# Patient Record
Sex: MALE | Race: WHITE | NOT HISPANIC OR LATINO | Employment: OTHER | URBAN - METROPOLITAN AREA
[De-identification: names, ages, dates, MRNs, and addresses within clinical notes are randomized per-mention and may not be internally consistent; named-entity substitution may affect disease eponyms.]

---

## 2017-01-03 DIAGNOSIS — E03.9 HYPOTHYROIDISM, UNSPECIFIED TYPE: ICD-10-CM

## 2017-01-03 RX ORDER — LEVOTHYROXINE SODIUM 0.1 MG/1
TABLET ORAL
Qty: 30 TAB | Refills: 0 | OUTPATIENT
Start: 2017-01-03

## 2017-01-03 RX ORDER — LEVOTHYROXINE SODIUM 88 UG/1
88 TABLET ORAL
Qty: 60 TAB | Refills: 0 | Status: SHIPPED | OUTPATIENT
Start: 2017-01-03 | End: 2017-03-01

## 2017-01-03 NOTE — TELEPHONE ENCOUNTER
We need to lower the dose of synthroid to 88mcg po q daily  Then repeat labs in 4-6 wks.  New medication filled.    Harry Morel MD  [facility]  [address] [place] [id number]

## 2017-01-04 NOTE — TELEPHONE ENCOUNTER
Phone Number Called: [phone] (home)     Message: Notified patient of new dose of levothyroxine and the lab requests. Patient stated understanding.    Left Message for patient to call back: N\A

## 2017-02-21 DIAGNOSIS — E03.1 CONGENITAL HYPOTHYROIDISM WITHOUT GOITER: ICD-10-CM

## 2017-02-21 DIAGNOSIS — E78.00 HYPERCHOLESTEREMIA: ICD-10-CM

## 2017-02-21 DIAGNOSIS — I10 ESSENTIAL HYPERTENSION: ICD-10-CM

## 2017-02-21 RX ORDER — SIMVASTATIN 20 MG
20 TABLET ORAL EVERY EVENING
Qty: 90 TAB | Refills: 0 | Status: SHIPPED | OUTPATIENT
Start: 2017-02-21 | End: 2017-03-01

## 2017-02-21 RX ORDER — HYDROCHLOROTHIAZIDE 25 MG/1
25 TABLET ORAL DAILY
Qty: 90 TAB | Refills: 0 | Status: SHIPPED | OUTPATIENT
Start: 2017-02-21 | End: 2017-06-05 | Stop reason: SDUPTHER

## 2017-02-21 RX ORDER — LEVOTHYROXINE SODIUM 0.1 MG/1
100 TABLET ORAL
Qty: 90 TAB | Refills: 0 | Status: SHIPPED | OUTPATIENT
Start: 2017-02-21 | End: 2017-06-05 | Stop reason: SDUPTHER

## 2017-02-23 ENCOUNTER — HOSPITAL ENCOUNTER (OUTPATIENT)
Dept: LAB | Facility: MEDICAL CENTER | Age: 67
End: 2017-02-23
Attending: FAMILY MEDICINE
Payer: MEDICARE

## 2017-02-23 DIAGNOSIS — E78.00 HYPERCHOLESTEREMIA: ICD-10-CM

## 2017-02-23 DIAGNOSIS — E03.1 CONGENITAL HYPOTHYROIDISM WITHOUT GOITER: ICD-10-CM

## 2017-02-23 DIAGNOSIS — I10 ESSENTIAL HYPERTENSION: ICD-10-CM

## 2017-02-23 LAB
ALBUMIN SERPL BCP-MCNC: 4.2 G/DL (ref 3.2–4.9)
ALBUMIN/GLOB SERPL: 1.4 G/DL
ALP SERPL-CCNC: 54 U/L (ref 30–99)
ALT SERPL-CCNC: 24 U/L (ref 2–50)
ANION GAP SERPL CALC-SCNC: 11 MMOL/L (ref 0–11.9)
AST SERPL-CCNC: 21 U/L (ref 12–45)
BASOPHILS # BLD AUTO: 0.06 K/UL (ref 0–0.12)
BASOPHILS NFR BLD AUTO: 1.1 % (ref 0–1.8)
BILIRUB SERPL-MCNC: 0.3 MG/DL (ref 0.1–1.5)
BUN SERPL-MCNC: 24 MG/DL (ref 8–22)
CALCIUM SERPL-MCNC: 9.7 MG/DL (ref 8.5–10.5)
CHLORIDE SERPL-SCNC: 101 MMOL/L (ref 96–112)
CHOLEST SERPL-MCNC: 240 MG/DL (ref 100–199)
CO2 SERPL-SCNC: 28 MMOL/L (ref 20–33)
CREAT SERPL-MCNC: 1.04 MG/DL (ref 0.5–1.4)
EOSINOPHIL # BLD: 0.38 K/UL (ref 0–0.51)
EOSINOPHIL NFR BLD AUTO: 6.8 % (ref 0–6.9)
ERYTHROCYTE [DISTWIDTH] IN BLOOD BY AUTOMATED COUNT: 43.6 FL (ref 35.9–50)
GLOBULIN SER CALC-MCNC: 3 G/DL (ref 1.9–3.5)
GLUCOSE SERPL-MCNC: 99 MG/DL (ref 65–99)
HCT VFR BLD AUTO: 47.9 % (ref 42–52)
HDLC SERPL-MCNC: 61 MG/DL
HGB BLD-MCNC: 16 G/DL (ref 14–18)
IMM GRANULOCYTES # BLD AUTO: 0.01 K/UL (ref 0–0.11)
IMM GRANULOCYTES NFR BLD AUTO: 0.2 % (ref 0–0.9)
LDLC SERPL CALC-MCNC: 151 MG/DL
LYMPHOCYTES # BLD: 1.39 K/UL (ref 1–4.8)
LYMPHOCYTES NFR BLD AUTO: 24.9 % (ref 22–41)
MCH RBC QN AUTO: 31.6 PG (ref 27–33)
MCHC RBC AUTO-ENTMCNC: 33.4 G/DL (ref 33.7–35.3)
MCV RBC AUTO: 94.5 FL (ref 81.4–97.8)
MONOCYTES # BLD: 0.64 K/UL (ref 0–0.85)
MONOCYTES NFR BLD AUTO: 11.5 % (ref 0–13.4)
NEUTROPHILS # BLD: 3.1 K/UL (ref 1.82–7.42)
NEUTROPHILS NFR BLD AUTO: 55.5 % (ref 44–72)
NRBC # BLD AUTO: 0 K/UL
NRBC BLD-RTO: 0 /100 WBC
PLATELET # BLD AUTO: 261 K/UL (ref 164–446)
PMV BLD AUTO: 9.7 FL (ref 9–12.9)
POTASSIUM SERPL-SCNC: 4.4 MMOL/L (ref 3.6–5.5)
PROT SERPL-MCNC: 7.2 G/DL (ref 6–8.2)
RBC # BLD AUTO: 5.07 M/UL (ref 4.7–6.1)
SODIUM SERPL-SCNC: 140 MMOL/L (ref 135–145)
T4 FREE SERPL-MCNC: 0.7 NG/DL (ref 0.53–1.43)
TRIGL SERPL-MCNC: 139 MG/DL (ref 0–149)
TSH SERPL DL<=0.005 MIU/L-ACNC: 7.94 UIU/ML (ref 0.3–3.7)
WBC # BLD AUTO: 5.6 K/UL (ref 4.8–10.8)

## 2017-02-23 PROCEDURE — 85025 COMPLETE CBC W/AUTO DIFF WBC: CPT

## 2017-02-23 PROCEDURE — 80061 LIPID PANEL: CPT

## 2017-02-23 PROCEDURE — 80053 COMPREHEN METABOLIC PANEL: CPT

## 2017-02-23 PROCEDURE — 84439 ASSAY OF FREE THYROXINE: CPT

## 2017-02-23 PROCEDURE — 36415 COLL VENOUS BLD VENIPUNCTURE: CPT

## 2017-02-23 PROCEDURE — 84443 ASSAY THYROID STIM HORMONE: CPT

## 2017-02-24 ENCOUNTER — TELEPHONE (OUTPATIENT)
Dept: MEDICAL GROUP | Age: 67
End: 2017-02-24

## 2017-02-24 NOTE — TELEPHONE ENCOUNTER
----- Message from Betina Morel M.D. sent at 2/23/2017  9:19 PM PST -----  To clarify, 1 and 1/2 tab = 150 mcg po q24h.       Harry Morel MD  [facility]  [address] [place] [id number]

## 2017-02-24 NOTE — TELEPHONE ENCOUNTER
FREDO ONLY    Phone Number Called: [phone] (home)       Message: SPOKE WITH PATIENT AND INFORMED HIM OF MESSAGE BELOW. PATIENT STATED UNDERSTANDING.    Left Message for patient to call back: N\A

## 2017-03-01 ENCOUNTER — OFFICE VISIT (OUTPATIENT)
Dept: MEDICAL GROUP | Age: 67
End: 2017-03-01
Payer: MEDICARE

## 2017-03-01 VITALS
WEIGHT: 166 LBS | BODY MASS INDEX: 26.68 KG/M2 | TEMPERATURE: 97.9 F | DIASTOLIC BLOOD PRESSURE: 86 MMHG | SYSTOLIC BLOOD PRESSURE: 128 MMHG | HEART RATE: 98 BPM | HEIGHT: 66 IN | OXYGEN SATURATION: 97 %

## 2017-03-01 DIAGNOSIS — E78.00 HYPERCHOLESTEREMIA: ICD-10-CM

## 2017-03-01 DIAGNOSIS — E03.1 CONGENITAL HYPOTHYROIDISM WITHOUT GOITER: ICD-10-CM

## 2017-03-01 DIAGNOSIS — Q67.0 CONGENITAL FACIAL ASYMMETRY: ICD-10-CM

## 2017-03-01 DIAGNOSIS — Z23 NEED FOR VACCINATION: ICD-10-CM

## 2017-03-01 DIAGNOSIS — I10 ESSENTIAL HYPERTENSION: ICD-10-CM

## 2017-03-01 DIAGNOSIS — Z23 NEED FOR PROPHYLACTIC VACCINATION WITH COMBINED VACCINE: ICD-10-CM

## 2017-03-01 PROCEDURE — 1101F PT FALLS ASSESS-DOCD LE1/YR: CPT | Performed by: FAMILY MEDICINE

## 2017-03-01 PROCEDURE — 3017F COLORECTAL CA SCREEN DOC REV: CPT | Performed by: FAMILY MEDICINE

## 2017-03-01 PROCEDURE — 4040F PNEUMOC VAC/ADMIN/RCVD: CPT | Performed by: FAMILY MEDICINE

## 2017-03-01 PROCEDURE — G8484 FLU IMMUNIZE NO ADMIN: HCPCS | Performed by: FAMILY MEDICINE

## 2017-03-01 PROCEDURE — G8432 DEP SCR NOT DOC, RNG: HCPCS | Performed by: FAMILY MEDICINE

## 2017-03-01 PROCEDURE — G8420 CALC BMI NORM PARAMETERS: HCPCS | Performed by: FAMILY MEDICINE

## 2017-03-01 PROCEDURE — 99214 OFFICE O/P EST MOD 30 MIN: CPT | Performed by: FAMILY MEDICINE

## 2017-03-01 PROCEDURE — 1036F TOBACCO NON-USER: CPT | Performed by: FAMILY MEDICINE

## 2017-03-01 RX ORDER — SIMVASTATIN 40 MG
40 TABLET ORAL EVERY EVENING
Qty: 90 TAB | Refills: 0 | Status: SHIPPED | OUTPATIENT
Start: 2017-03-01 | End: 2017-07-26 | Stop reason: SDUPTHER

## 2017-03-01 ASSESSMENT — PAIN SCALES - GENERAL: PAINLEVEL: NO PAIN

## 2017-03-01 NOTE — PROGRESS NOTES
This medical record contains text that has been entered with the assistance of computer voice recognition and dictation software.  Therefore, it may contain unintended errors in text, spelling, punctuation, or grammar    Chief Complaint   Patient presents with   • Follow-Up     lab review,HTN, medication management       Dannie Fofana is a 66 y.o. male here evaluation and management of: Routine follow-up, hypertension, labrum, hypothyroidism      HPI:     Hypercholesteremia  He has been compliant with his simvastatin 20 mg by mouth daily at bedtime. The patient has been on a statin for years and tolerating this fine. The patient denies any muscle aches, no abdominal pain and no history of elevated liver enzymes.    Results for DANNIE FOFANA (MRN [id number]) as of 3/1/2017 13:37   Ref. Range 2/23/2017 08:06   Cholesterol,Tot Latest Ref Range: 100-199 mg/dL 240 (H)   Triglycerides Latest Ref Range: 0-149 mg/dL 139   HDL Latest Ref Range: >=40 mg/dL 61   LDL Latest Ref Range: <100 mg/dL 151 (H)       Essential hypertension  After increasing his HCTZ to 25mg his bp is normal at home. However he states that he has had some elevated bp's at home (120-150/82-95) but thinks he was measuring at wrong.  We've reviewed how to measure her blood pressure. He denies any headaches, no tunnel vision, no fatigue, no syncopal episodes.    Congenital hypothyroidism without goiter  We recently increased his Synthroid to 100 µg by mouth daily as he was not at goal. He is due for repeat labs in 4 weeks.    Results for DANNIE FOFANA (MRN [id number]) as of 3/1/2017 14:01   Ref. Range 2/23/2017 08:06   TSH Latest Ref Range: 0.300-3.700 uIU/mL 7.940 (H)   Free T-4 Latest Ref Range: 0.53-1.43 ng/dL 0.70       Congenital facial asymmetry  Right side of his face last mouth always has dropped. He states he has no history of stroke, no history of Bell's palsy that he was born this way.    Need for prophylactic vaccination with  combined vaccine  Patient has reviewed shingles vaccine as well as flu vaccine.    Current medicines (including changes today)  Current Outpatient Prescriptions   Medication Sig Dispense Refill   • simvastatin (ZOCOR) 40 MG Tab Take 1 Tab by mouth every evening. 90 Tab 0   • Misc. Devices Misc Please provide patient with shingles vaccine 1 Application 0   • levothyroxine (SYNTHROID) 100 MCG Tab Take 1 Tab by mouth Every morning on an empty stomach. 90 Tab 0   • aspirin (ASA) 81 MG Chew Tab chewable tablet Take 1 Tab by mouth every day. 100 Tab 2   • sildenafil citrate (VIAGRA) 100 MG tablet Take 0.5 Tabs by mouth as needed for Erectile Dysfunction. 30 Tab 3   • Misc. Devices Misc Please provide patient with shingles vaccine 1 Application 0   • timolol (BETIMOL) 0.5 % ophthalmic solution Place 1 Drop in both eyes 2 times a day. use in affected eye(s)   Indications: Increased Pressure Within the Eye     • hydrochlorothiazide (HYDRODIURIL) 25 MG Tab Take 1 Tab by mouth every day. 90 Tab 0   • hydrochlorothiazide (HYDRODIURIL) 12.5 MG tablet Take 1 Tab by mouth every day. (Patient not taking: Reported on 3/1/2017) 60 Tab 0   • levothyroxine (SYNTHROID) 125 MCG Tab Take 1 Tab by mouth every day. (Patient not taking: Reported on 8/12/2016) 60 Tab 0   • levothyroxine (SYNTHROID) 125 MCG TABS TAKE ONE TABLET BY MOUTH DAILY (Patient not taking: Reported on 3/1/2017) 90 Tab 3   • Brimonidine Tartrate-Timolol (COMBIGAN) 0.2-0.5 % SOLN by Ophthalmic route.       No current facility-administered medications for this visit.     He  has a past medical history of Thyroid disease.  He  has past surgical history that includes hernia repair.  Social History   Substance Use Topics   • Smoking status: Never Smoker    • Smokeless tobacco: Never Used   • Alcohol Use: 0.0 oz/week     0 Standard drinks or equivalent per week      Comment: 1 glass a wine 5 days a week.     Social History     Social History Narrative     Family History  "  Problem Relation Age of Onset   • Heart Attack Mother    • Cancer Father      brain tumor   • Stroke Maternal Grandmother 75   • Heart Disease Paternal Grandmother    • Heart Attack Paternal Grandfather      Family Status   Relation Status Death Age   • Mother     • Father     • Brother Alive    • Maternal Grandmother     • Maternal Grandfather     • Paternal Grandmother     • Paternal Grandfather           ROS  Please see history of present illness    All other systems reviewed and are negative     Objective:     Blood pressure 128/86, pulse 98, temperature 36.6 °C (97.9 °F), height 1.676 m (5' 6\"), weight 75.297 kg (166 lb), SpO2 97 %. Body mass index is 26.81 kg/(m^2).  Physical Exam:    Constitutional: Alert, no distress.  Skin: Warm, dry, good turgor, no rashes in visible areas.  FACE--right side facial droop noted, unchanged (congenital)  Eye: Equal, round and reactive, conjunctiva clear, lids normal.  ENMT: Lips without lesions, good dentition, oropharynx clear.  Neck: Trachea midline, no masses, no thyromegaly. No cervical or supraclavicular lymphadenopathy.  Respiratory: Unlabored respiratory effort, lungs clear to auscultation, no wheezes, no ronchi.  Cardiovascular: Normal S1, S2, no murmur, no edema.  Abdomen: Soft, non-tender, no masses, no hepatosplenomegaly.  Psych: Alert and oriented x3, normal affect and mood.          Assessment and Plan:   The following treatment plan was discussed, again this medical record contains text that has been entered with the assistance of computer voice recognition and dictation software.  Therefore, it may contain unintended errors in text, spelling, punctuation, or grammar      1. Hypercholesteremia  We will increase Zocor to 40 mg by mouth daily at bedtime  Repeat labs in 2-3 months  He has been tolerating statin  - simvastatin (ZOCOR) 40 MG Tab; Take 1 Tab by mouth every evening.  Dispense: 90 Tab; Refill: 0      2. " Congenital hypothyroidism without goiter  We increased Synthroid to 100 mg by mouth daily  Repeat labs in 4-6 weeks    3. Need for vaccination  He refuses shingles vaccine as well as flu vaccine  - Misc. Devices Misc; Please provide patient with shingles vaccine  Dispense: 1 Application; Refill: 0    4. Essential hypertension  We will stay on this dose of her thiazide  He was instructed on how to appropriately manage his blood pressure  Send  Me  week home blood pressure log  Then we will adjust accordingly    6. Congenital facial asymmetry  Not  from CVA or bell's palsy  This has been present from birth          Followup: No Follow-up on file.

## 2017-03-01 NOTE — ASSESSMENT & PLAN NOTE
Right side of his face last mouth always has dropped. He states he has no history of stroke, no history of Bell's palsy that he was born this way.

## 2017-03-01 NOTE — ASSESSMENT & PLAN NOTE
We recently increased his Synthroid to 100 µg by mouth daily as he was not at goal. He is due for repeat labs in 4 weeks.    Results for DANNIE FOFANA (MRN [id number]) as of 3/1/2017 14:01   Ref. Range 2/23/2017 08:06   TSH Latest Ref Range: 0.300-3.700 uIU/mL 7.940 (H)   Free T-4 Latest Ref Range: 0.53-1.43 ng/dL 0.70

## 2017-03-01 NOTE — ASSESSMENT & PLAN NOTE
He has been compliant with his simvastatin 20 mg by mouth daily at bedtime. The patient has been on a statin for years and tolerating this fine. The patient denies any muscle aches, no abdominal pain and no history of elevated liver enzymes.    Results for BRIGIDO QUINTENJANNETHCLARI (MRN [id number]) as of 3/1/2017 13:37   Ref. Range 2/23/2017 08:06   Cholesterol,Tot Latest Ref Range: 100-199 mg/dL 240 (H)   Triglycerides Latest Ref Range: 0-149 mg/dL 139   HDL Latest Ref Range: >=40 mg/dL 61   LDL Latest Ref Range: <100 mg/dL 151 (H)

## 2017-03-01 NOTE — MR AVS SNAPSHOT
"        Yovany Estes   3/1/2017 1:20 PM   Office Visit   MRN: [id number]    Department:  [address] [place] [address]   Dept Phone:  [phone]    Description:  Male : 1950   Provider:  Betina Morel M.D.           Reason for Visit     Follow-Up lab review,HTN, medication management      Allergies as of 3/1/2017     No Known Allergies      You were diagnosed with     Hypercholesteremia   [868015]       Elevated BP   [820567]       Congenital hypothyroidism without goiter   [721361]       Need for vaccination   [275242]       Essential hypertension   [5132423]       Congenital facial asymmetry   [472878]       Need for prophylactic vaccination with combined vaccine   [122284]         Vital Signs     Blood Pressure Pulse Temperature Height Weight Body Mass Index    128/86 mmHg 98 36.6 °C (97.9 °F) 1.676 m (5' 6\") 75.297 kg (166 lb) 26.81 kg/m2    Oxygen Saturation Smoking Status                97% Never Smoker           Basic Information     Date Of Birth Sex Race Ethnicity Preferred Language    1950 Male White Non- English      Problem List              ICD-10-CM Priority Class Noted - Resolved    Hypercholesteremia E78.00   2015 - Present    Glaucoma H40.9   2015 - Present    Need for prophylactic vaccination with combined vaccine Z23   2016 - Present    Elevated BP I10   2016 - Present    Preventative health care Z00.00   2016 - Present    ED (erectile dysfunction) N52.9   11/15/2016 - Present    Need for vaccination Z23   11/15/2016 - Present    Essential hypertension I10   11/15/2016 - Present    Congenital hypothyroidism without goiter E03.1   3/1/2017 - Present    Congenital facial asymmetry Q67.0   3/1/2017 - Present      Health Maintenance        Date Due Completion Dates    IMM ZOSTER VACCINE 2008 ---    IMM INFLUENZA (1) 2016 ---    COLONOSCOPY 3/23/2023 3/23/2013 (Prv Comp)    Override on 3/23/2013: Previously completed (was told clear for 10 " more years.)    IMM DTaP/Tdap/Td Vaccine (2 - Td) 4/29/2025 4/29/2015            Current Immunizations     13-VALENT PCV PREVNAR 4/29/2015    Pneumococcal polysaccharide vaccine (PPSV-23) 8/18/2016  5:37 PM    Tdap Vaccine 4/29/2015      Below and/or attached are the medications your provider expects you to take. Review all of your home medications and newly ordered medications with your provider and/or pharmacist. Follow medication instructions as directed by your provider and/or pharmacist. Please keep your medication list with you and share with your provider. Update the information when medications are discontinued, doses are changed, or new medications (including over-the-counter products) are added; and carry medication information at all times in the event of emergency situations     Allergies:  No Known Allergies          Medications  Valid as of: March 01, 2017 -  2:09 PM    Generic Name Brand Name Tablet Size Instructions for use    Aspirin (Chew Tab) ASA 81 MG Take 1 Tab by mouth every day.        Brimonidine Tartrate-Timolol (Solution) Brimonidine Tartrate-Timolol 0.2-0.5 % by Ophthalmic route.        HydroCHLOROthiazide (Tab) HYDRODIURIL 12.5 MG Take 1 Tab by mouth every day.        HydroCHLOROthiazide (Tab) HYDRODIURIL 25 MG Take 1 Tab by mouth every day.        Levothyroxine Sodium (Tab) SYNTHROID 125 MCG TAKE ONE TABLET BY MOUTH DAILY        Levothyroxine Sodium (Tab) SYNTHROID 125 MCG Take 1 Tab by mouth every day.        Levothyroxine Sodium (Tab) SYNTHROID 100 MCG Take 1 Tab by mouth Every morning on an empty stomach.        Misc. Devices (Misc) Misc. Devices  Please provide patient with shingles vaccine        Misc. Devices (Misc) Misc. Devices  Please provide patient with shingles vaccine        Sildenafil Citrate (Tab) VIAGRA 100 MG Take 0.5 Tabs by mouth as needed for Erectile Dysfunction.        Simvastatin (Tab) ZOCOR 40 MG Take 1 Tab by mouth every evening.        Timolol Hemihydrate  (Solution) BETIMOL 0.5 % Place 1 Drop in both eyes 2 times a day. use in affected eye(s)   Indications: Increased Pressure Within the Eye        .                 Medicines prescribed today were sent to:     [id number] - [place], [place] - [address] [place] [address] AT [address] [address]    Phone: [phone] Fax: [phone]    Open 24 Hours?: No      Medication refill instructions:       If your prescription bottle indicates you have medication refills left, it is not necessary to call your provider’s office. Please contact your pharmacy and they will refill your medication.    If your prescription bottle indicates you do not have any refills left, you may request refills at any time through one of the following ways: The online [employer] system (except Urgent Care), by calling your provider’s office, or by asking your pharmacy to contact your provider’s office with a refill request. Medication refills are processed only during regular business hours and may not be available until the next business day. Your provider may request additional information or to have a follow-up visit with you prior to refilling your medication.   *Please Note: Medication refills are assigned a new Rx number when refilled electronically. Your pharmacy may indicate that no refills were authorized even though a new prescription for the same medication is available at the pharmacy. Please request the medicine by name with the pharmacy before contacting your provider for a refill.           [employer] Access Code: Activation code not generated  Current [employer] Status: Active

## 2017-03-01 NOTE — ASSESSMENT & PLAN NOTE
After increasing his HCTZ to 25mg his bp is normal at home. However he states that he has had some elevated bp's at home (120-150/82-95) but thinks he was measuring at wrong.  We've reviewed how to measure her blood pressure. He denies any headaches, no tunnel vision, no fatigue, no syncopal episodes.

## 2017-06-05 ENCOUNTER — PATIENT MESSAGE (OUTPATIENT)
Dept: MEDICAL GROUP | Age: 67
End: 2017-06-05

## 2017-06-05 DIAGNOSIS — I10 ESSENTIAL HYPERTENSION: ICD-10-CM

## 2017-06-05 RX ORDER — LEVOTHYROXINE SODIUM 0.1 MG/1
100 TABLET ORAL
Qty: 90 TAB | Refills: 0 | Status: SHIPPED | OUTPATIENT
Start: 2017-06-05 | End: 2017-09-06 | Stop reason: SDUPTHER

## 2017-06-05 RX ORDER — HYDROCHLOROTHIAZIDE 25 MG/1
25 TABLET ORAL DAILY
Qty: 90 TAB | Refills: 0 | Status: SHIPPED | OUTPATIENT
Start: 2017-06-05 | End: 2017-09-14 | Stop reason: SDUPTHER

## 2017-07-07 ENCOUNTER — OFFICE VISIT (OUTPATIENT)
Dept: URGENT CARE | Facility: PHYSICIAN GROUP | Age: 67
End: 2017-07-07
Payer: MEDICARE

## 2017-07-07 ENCOUNTER — HOSPITAL ENCOUNTER (OUTPATIENT)
Facility: MEDICAL CENTER | Age: 67
End: 2017-07-07
Attending: PHYSICIAN ASSISTANT
Payer: MEDICARE

## 2017-07-07 VITALS
RESPIRATION RATE: 14 BRPM | HEIGHT: 67 IN | SYSTOLIC BLOOD PRESSURE: 132 MMHG | TEMPERATURE: 98.4 F | HEART RATE: 87 BPM | BODY MASS INDEX: 24.64 KG/M2 | WEIGHT: 157 LBS | OXYGEN SATURATION: 95 % | DIASTOLIC BLOOD PRESSURE: 90 MMHG

## 2017-07-07 DIAGNOSIS — M10.9 ACUTE GOUT INVOLVING TOE OF RIGHT FOOT, UNSPECIFIED CAUSE: ICD-10-CM

## 2017-07-07 LAB — URATE SERPL-MCNC: 3.8 MG/DL (ref 2.5–8.3)

## 2017-07-07 PROCEDURE — 99214 OFFICE O/P EST MOD 30 MIN: CPT | Performed by: PHYSICIAN ASSISTANT

## 2017-07-07 PROCEDURE — 84550 ASSAY OF BLOOD/URIC ACID: CPT

## 2017-07-07 PROCEDURE — 36415 COLL VENOUS BLD VENIPUNCTURE: CPT | Performed by: PHYSICIAN ASSISTANT

## 2017-07-07 RX ORDER — INDOMETHACIN 50 MG/1
50 CAPSULE ORAL 3 TIMES DAILY
Qty: 90 CAP | Refills: 0 | Status: SHIPPED | OUTPATIENT
Start: 2017-07-07 | End: 2018-06-19 | Stop reason: SDUPTHER

## 2017-07-07 ASSESSMENT — ENCOUNTER SYMPTOMS
CHILLS: 0
SHORTNESS OF BREATH: 0
HEADACHES: 0
ABDOMINAL PAIN: 0
DIZZINESS: 0
SPUTUM PRODUCTION: 0
FEVER: 0
DIARRHEA: 0
VOMITING: 0
NAUSEA: 0

## 2017-07-07 NOTE — PROGRESS NOTES
"Subjective:      Yovany Estes is a 66 y.o. male who presents with Foot Pain            HPI  Patient presents to urgent care reporting a gout flare up x 3 days in his right great toe. States he had a mild amount of pain 4 days ago that resolved quickly, then early this morning around 1-2 am he awoke with severe pain to his right great toe. Reports one episode of a gout attack almost 2 years ago. He was given allopurinol at that time but only took it for about 1 month, then stopped. He took one dose of the allopurinol this morning without any relief. States he does eat red meat and seafood, but infrequently. He is currently taking HCTZ for HTN. No known kidney disease.     Review of Systems   Constitutional: Negative for fever and chills.   Respiratory: Negative for sputum production and shortness of breath.    Cardiovascular: Negative for chest pain.   Gastrointestinal: Negative for nausea, vomiting, abdominal pain and diarrhea.   Genitourinary: Negative.    Musculoskeletal:        Positive for toe pain.    Neurological: Negative for dizziness and headaches.          Objective:     /90 mmHg  Pulse 87  Temp(Src) 36.9 °C (98.4 °F)  Resp 14  Ht 1.702 m (5' 7\")  Wt 71.215 kg (157 lb)  BMI 24.58 kg/m2  SpO2 95%     Physical Exam   Constitutional: He is oriented to person, place, and time. He appears well-developed and well-nourished. No distress.   HENT:   Head: Normocephalic and atraumatic.   Eyes: Pupils are equal, round, and reactive to light.   Neck: Normal range of motion.   Cardiovascular: Normal rate.    Pulmonary/Chest: Effort normal.   Musculoskeletal: Normal range of motion.        Feet:    Erythema, edema, and TTP over right medial MTP joint. Hot to touch. Decreased ROM secondary to pain.    Neurological: He is alert and oriented to person, place, and time.   Skin: Skin is warm and dry. He is not diaphoretic.   Psychiatric: He has a normal mood and affect. His behavior is normal.   Nursing " note and vitals reviewed.              PMH:  has a past medical history of Thyroid disease.  MEDS:   Current outpatient prescriptions:   •  indomethacin (INDOCIN) 50 MG Cap, Take 1 Cap by mouth 3 times a day., Disp: 90 Cap, Rfl: 0  •  hydrochlorothiazide (HYDRODIURIL) 25 MG Tab, Take 1 Tab by mouth every day., Disp: 90 Tab, Rfl: 0  •  levothyroxine (SYNTHROID) 100 MCG Tab, Take 1 Tab by mouth Every morning on an empty stomach., Disp: 90 Tab, Rfl: 0  •  simvastatin (ZOCOR) 40 MG Tab, Take 1 Tab by mouth every evening., Disp: 90 Tab, Rfl: 0  •  Misc. Devices Misc, Please provide patient with shingles vaccine, Disp: 1 Application, Rfl: 0  •  aspirin (ASA) 81 MG Chew Tab chewable tablet, Take 1 Tab by mouth every day., Disp: 100 Tab, Rfl: 2  •  sildenafil citrate (VIAGRA) 100 MG tablet, Take 0.5 Tabs by mouth as needed for Erectile Dysfunction., Disp: 30 Tab, Rfl: 3  •  Misc. Devices Misc, Please provide patient with shingles vaccine, Disp: 1 Application, Rfl: 0  •  hydrochlorothiazide (HYDRODIURIL) 12.5 MG tablet, Take 1 Tab by mouth every day. (Patient not taking: Reported on 3/1/2017), Disp: 60 Tab, Rfl: 0  •  levothyroxine (SYNTHROID) 125 MCG Tab, Take 1 Tab by mouth every day. (Patient not taking: Reported on 8/12/2016), Disp: 60 Tab, Rfl: 0  •  levothyroxine (SYNTHROID) 125 MCG TABS, TAKE ONE TABLET BY MOUTH DAILY (Patient not taking: Reported on 3/1/2017), Disp: 90 Tab, Rfl: 3  •  Brimonidine Tartrate-Timolol (COMBIGAN) 0.2-0.5 % SOLN, by Ophthalmic route., Disp: , Rfl:   •  timolol (BETIMOL) 0.5 % ophthalmic solution, Place 1 Drop in both eyes 2 times a day. use in affected eye(s)   Indications: Increased Pressure Within the Eye, Disp: , Rfl:   ALLERGIES: No Known Allergies  SURGHX:   Past Surgical History   Procedure Laterality Date   • Hernia repair       SOCHX:  reports that he has never smoked. He has never used smokeless tobacco. He reports that he drinks alcohol. He reports that he does not use illicit  drugs.  FH: family history includes Cancer in his father; Heart Attack in his mother and paternal grandfather; Heart Disease in his paternal grandmother; Stroke (age of onset: 75) in his maternal grandmother.    Assessment/Plan:     1. Acute gout involving toe of right foot, unspecified cause  - URIC ACID, SERUM  - indomethacin (INDOCIN) 50 MG Cap; Take 1 Cap by mouth 3 times a day.  Dispense: 90 Cap; Refill: 0   - Take with food    Discussed gout diet at today's visit. Avoid red meats, seafood, and alcohol while symptomatic. Increased fluids. Advised patient to discuss HCTZ use with PCP at his next appointment, along with recommendations for allopurinol therapy. The patient demonstrated a good understanding and agreed with the treatment plan.

## 2017-07-07 NOTE — MR AVS SNAPSHOT
"        Yovany Estes   2017 10:30 AM   Office Visit   MRN: [id number]    Department:  [facility]   Dept Phone:  [phone]    Description:  Male : 1950   Provider:  Raya Randall PA-C           Reason for Visit     Foot Pain right foot, gout flare up      Allergies as of 2017     No Known Allergies      You were diagnosed with     Acute gout involving toe of right foot, unspecified cause   [[id number]]         Vital Signs     Blood Pressure Pulse Temperature Respirations Height Weight    132/90 mmHg 87 36.9 °C (98.4 °F) 14 1.702 m (5' 7\") 71.215 kg (157 lb)    Body Mass Index Oxygen Saturation Smoking Status             24.58 kg/m2 95% Never Smoker          Basic Information     Date Of Birth Sex Race Ethnicity Preferred Language    1950 Male White Non- English      Problem List              ICD-10-CM Priority Class Noted - Resolved    Hypercholesteremia E78.00   2015 - Present    Glaucoma H40.9   2015 - Present    Need for prophylactic vaccination with combined vaccine Z23   2016 - Present    Elevated BP [id number]   2016 - Present    Preventative health care Z00.00   2016 - Present    ED (erectile dysfunction) N52.9   11/15/2016 - Present    Need for vaccination Z23   11/15/2016 - Present    Essential hypertension I10   11/15/2016 - Present    Congenital hypothyroidism without goiter E03.1   3/1/2017 - Present    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 Review all of your home medications and newly ordered medications with your provider and/or pharmacist. Follow medication instructions as directed by your provider and/or pharmacist. Please keep your medication list with you and share with your provider. Update the information when medications are discontinued, doses are changed, or new medications (including over-the-counter products) are added; and carry medication information at all times in the event of emergency situations     Allergies:  No Known Allergies          Medications  Valid as of: July 07, 2017 - 11:21 AM    Generic Name Brand Name Tablet Size Instructions for use    Aspirin (Chew Tab) ASA 81 MG Take 1 Tab by mouth every day.        Brimonidine Tartrate-Timolol (Solution) Brimonidine Tartrate-Timolol 0.2-0.5 % by Ophthalmic route.        HydroCHLOROthiazide (Tab) HYDRODIURIL 12.5 MG Take 1 Tab by mouth every day.        HydroCHLOROthiazide (Tab) HYDRODIURIL 25 MG Take 1 Tab by mouth every day.        Indomethacin (Cap) INDOCIN 50 MG Take 1 Cap by mouth 3 times a day.        Levothyroxine Sodium (Tab) SYNTHROID 125 MCG TAKE ONE TABLET BY MOUTH DAILY        Levothyroxine Sodium (Tab) SYNTHROID 125 MCG Take 1 Tab by mouth every day.        Levothyroxine Sodium (Tab) SYNTHROID 100 MCG Take 1 Tab by mouth Every morning on an empty stomach.        Misc. Devices (Misc) Misc. Devices  Please provide patient with shingles vaccine        Misc. Devices (Misc) Misc. Devices  Please provide patient with shingles vaccine        Sildenafil Citrate (Tab) VIAGRA 100 MG Take 0.5 Tabs by mouth as needed for Erectile Dysfunction.        Simvastatin (Tab) ZOCOR 40 MG Take 1 Tab by mouth every evening.        Timolol Hemihydrate (Solution) BETIMOL 0.5 % Place 1 Drop in both eyes 2 times a day. use in affected eye(s)   Indications: Increased Pressure Within the Eye        .                 Medicines  prescribed today were sent to:     [facility][id number] - [place], [place] - [address]    [address] [place] [address]    Phone: [phone] Fax: [phone]    Open 24 Hours?: No      Medication refill instructions:       If your prescription bottle indicates you have medication refills left, it is not necessary to call your provider’s office. Please contact your pharmacy and they will refill your medication.    If your prescription bottle indicates you do not have any refills left, you may request refills at any time through one of the following ways: The online [employer] system (except Urgent Care), by calling your provider’s office, or by asking your pharmacy to contact your provider’s office with a refill request. Medication refills are processed only during regular business hours and may not be available until the next business day. Your provider may request additional information or to have a follow-up visit with you prior to refilling your medication.   *Please Note: Medication refills are assigned a new Rx number when refilled electronically. Your pharmacy may indicate that no refills were authorized even though a new prescription for the same medication is available at the pharmacy. Please request the medicine by name with the pharmacy before contacting your provider for a refill.           [employer] Access Code: Activation code not generated  Current [employer] Status: Active

## 2017-07-08 ENCOUNTER — TELEPHONE (OUTPATIENT)
Dept: URGENT CARE | Facility: CLINIC | Age: 67
End: 2017-07-08

## 2017-07-08 NOTE — TELEPHONE ENCOUNTER
Spoke to patient and informed him of normal uric acid level. Patient reports his symptoms are significantly improved since starting Indomethacin yesterday. Encouraged to continue taking medication until symptoms completely resolve.

## 2017-07-26 ENCOUNTER — PATIENT MESSAGE (OUTPATIENT)
Dept: MEDICAL GROUP | Age: 67
End: 2017-07-26

## 2017-07-26 DIAGNOSIS — E78.00 HYPERCHOLESTEREMIA: ICD-10-CM

## 2017-07-26 RX ORDER — SIMVASTATIN 40 MG
40 TABLET ORAL EVERY EVENING
Qty: 90 TAB | Refills: 0 | Status: SHIPPED | OUTPATIENT
Start: 2017-07-26 | End: 2017-11-02 | Stop reason: SDUPTHER

## 2017-07-26 NOTE — TELEPHONE ENCOUNTER
Was the patient seen in the last year in this department? Yes     Does patient have an active prescription for medications requested? No     Received Request Via: Patient     Requested Prescriptions     Pending Prescriptions Disp Refills   • simvastatin (ZOCOR) 40 MG Tab 90 Tab 0     Sig: Take 1 Tab by mouth every evening.

## 2017-09-14 DIAGNOSIS — I10 ESSENTIAL HYPERTENSION: ICD-10-CM

## 2017-09-14 DIAGNOSIS — Z00.00 PREVENTATIVE HEALTH CARE: ICD-10-CM

## 2017-09-14 RX ORDER — HYDROCHLOROTHIAZIDE 25 MG/1
TABLET ORAL
Qty: 90 TAB | Refills: 0 | Status: SHIPPED | OUTPATIENT
Start: 2017-09-14 | End: 2017-12-19 | Stop reason: SDUPTHER

## 2017-09-14 RX ORDER — ASPIRIN 81 MG/1
TABLET, CHEWABLE ORAL
Qty: 90 TAB | Refills: 0 | Status: SHIPPED | OUTPATIENT
Start: 2017-09-14 | End: 2017-12-19 | Stop reason: SDUPTHER

## 2017-11-02 DIAGNOSIS — E78.00 HYPERCHOLESTEREMIA: ICD-10-CM

## 2017-11-03 RX ORDER — SIMVASTATIN 40 MG
TABLET ORAL
Qty: 90 TAB | Refills: 1 | Status: SHIPPED | OUTPATIENT
Start: 2017-11-03 | End: 2018-06-08 | Stop reason: SDUPTHER

## 2017-11-10 ENCOUNTER — PATIENT OUTREACH (OUTPATIENT)
Dept: HEALTH INFORMATION MANAGEMENT | Facility: OTHER | Age: 67
End: 2017-11-10

## 2017-11-11 NOTE — PROGRESS NOTES
Attempt #:1    WebIZ Checked & Epic Updated: Yes  · WebIZ Recommendations: FLU and ZOSTAVAX (Shingles)  · Is patient due for Tdap? NO  · Is patient due for Shingles? YES. Patient was not notified of copay/out of pocket cost.  [facility] Verified: yes  Verify PCP: yes    Communication Preference Obtained: yes     Review Care Team: yes    Annual Wellness Visit Scheduling  1. Scheduling Status:Scheduled          Care Gap Scheduling (Attempt to Schedule EACH Overdue Care Gap!)     Health Maintenance Due   Topic Date Due   • IMM ZOSTER VACCINE  12/09/2008   • Annual Wellness Visit  04/29/2016   • IMM INFLUENZA (1) 09/01/2017        Scheduled patient for Annual Wellness Visit and Immunizations: FLU and TDAP       ViaView Activation: already active  ViaView Katerine: no  Virtual Visits: no  Opt In to Text Messages: no

## 2017-11-22 ENCOUNTER — TELEPHONE (OUTPATIENT)
Dept: MEDICAL GROUP | Age: 67
End: 2017-11-22

## 2017-11-22 NOTE — TELEPHONE ENCOUNTER
ANNUAL WELLNESS VISIT PRE-VISIT PLANNING     1.  Reviewed note from last office visit with PCP: YES    2.  If any orders were placed at last visit, do we have Results/Consult Notes?        •  Labs - Labs ordered, completed on 7/7 and results are in chart.   Note: If patient appointment is for lab review and patient did not complete labs, check with provider if OK to reschedule patient until labs completed.       •  Imaging - Imaging was not ordered at last office visit.       •  Referrals - No referrals were ordered at last office visit.    3.  Immunizations were updated in [facility] using WebIZ?: Yes       •  WebIZ Recommendations: FLU and ZOSTAVAX (Shingles)       •  Is patient due for Tdap? NO       •  Is patient due for Shingles? YES. Patient was notified of copay/out of pocket cost.     4.  Patient is due for the following Health Maintenance Topics:   Health Maintenance Due   Topic Date Due   • IMM ZOSTER VACCINE  12/09/2008   • Annual Wellness Visit  04/29/2016   • IMM INFLUENZA (1) 09/01/2017       - Patient is up-to-date on all Health Maintenance topics. No records have been requested at this time.    5.  Reviewed/Updated the following with patient:       •   Preferred Pharmacy? YES       •   Preferred Lab? YES       •   Preferred Communication? YES       •   Allergies? YES       •   Medications? YES. Was Abstract Encounter opened and chart updated? YES       •   Social History? YES. Was Abstract Encounter opened and chart updated? YES       •   Family History (document living status of immediate family members and if + hx of cancer, diabetes, hypertension, hyperlipidemia, heart attack, stroke) YES. Was Abstract Encounter opened and chart updated? YES    6.  Care Team Updated:       •   DME Company (gait device, O2, CPAP, etc.): YES       •   Other Specialists (eye doctor, derm, GYN, cardiology, endo, etc): YES    7.  Patient has the following Care Path diagnoses on Problem List:  NONE    8.  Specialty Comments  was updated with diagnosis information provided by SCP: N/A    9.  Patient was advised: “This is a free wellness visit. The provider will screen for medical conditions to help you stay healthy. If you have other concerns to address you may be asked to discuss these at a separate visit or there may be an additional fee.”     6.  Patient was informed to arrive 15 min prior to their scheduled appointment and bring in their medication bottles.

## 2017-11-30 ENCOUNTER — OFFICE VISIT (OUTPATIENT)
Dept: MEDICAL GROUP | Age: 67
End: 2017-11-30
Payer: MEDICARE

## 2017-11-30 VITALS
SYSTOLIC BLOOD PRESSURE: 138 MMHG | DIASTOLIC BLOOD PRESSURE: 82 MMHG | HEART RATE: 76 BPM | TEMPERATURE: 97.7 F | BODY MASS INDEX: 25.3 KG/M2 | HEIGHT: 67 IN | WEIGHT: 161.2 LBS | OXYGEN SATURATION: 93 %

## 2017-11-30 DIAGNOSIS — Q67.0 CONGENITAL FACIAL ASYMMETRY: ICD-10-CM

## 2017-11-30 DIAGNOSIS — H40.10X0 OPEN-ANGLE GLAUCOMA OF LEFT EYE, UNSPECIFIED GLAUCOMA STAGE, UNSPECIFIED OPEN-ANGLE GLAUCOMA TYPE: ICD-10-CM

## 2017-11-30 DIAGNOSIS — E03.1 CONGENITAL HYPOTHYROIDISM WITHOUT GOITER: ICD-10-CM

## 2017-11-30 DIAGNOSIS — I10 ESSENTIAL HYPERTENSION: ICD-10-CM

## 2017-11-30 DIAGNOSIS — Z00.00 MEDICARE ANNUAL WELLNESS VISIT, INITIAL: ICD-10-CM

## 2017-11-30 DIAGNOSIS — M77.12 LATERAL EPICONDYLITIS OF LEFT ELBOW: ICD-10-CM

## 2017-11-30 DIAGNOSIS — N52.01 ERECTILE DYSFUNCTION DUE TO ARTERIAL INSUFFICIENCY: ICD-10-CM

## 2017-11-30 DIAGNOSIS — E78.00 HYPERCHOLESTEREMIA: ICD-10-CM

## 2017-11-30 DIAGNOSIS — K59.01 SLOW TRANSIT CONSTIPATION: ICD-10-CM

## 2017-11-30 PROCEDURE — G0439 PPPS, SUBSEQ VISIT: HCPCS | Performed by: FAMILY MEDICINE

## 2017-11-30 RX ORDER — DOCUSATE SODIUM 100 MG/1
100 CAPSULE, LIQUID FILLED ORAL 2 TIMES DAILY
Qty: 90 CAP | Refills: 0 | Status: SHIPPED | OUTPATIENT
Start: 2017-11-30 | End: 2019-10-24

## 2017-11-30 ASSESSMENT — PATIENT HEALTH QUESTIONNAIRE - PHQ9: CLINICAL INTERPRETATION OF PHQ2 SCORE: 0

## 2017-11-30 NOTE — PROGRESS NOTES
Chief Complaint   Patient presents with   • Annual Wellness Visit         HPI:  Yovany is a 66 y.o. here for Medicare Annual Wellness Visit        Patient Active Problem List    Diagnosis Date Noted   • Slow transit constipation 11/30/2017   • Lateral epicondylitis of left elbow 11/30/2017   • Medicare annual wellness visit, initial 11/30/2017   • Congenital hypothyroidism without goiter 03/01/2017   • Congenital facial asymmetry 03/01/2017   • ED (erectile dysfunction) 11/15/2016   • Need for vaccination 11/15/2016   • Essential hypertension 11/15/2016   • Need for prophylactic vaccination with combined vaccine 08/18/2016   • Elevated BP 08/18/2016   • Preventative health care 08/18/2016   • Hypercholesteremia 04/29/2015   • Glaucoma 04/29/2015       Current Outpatient Prescriptions   Medication Sig Dispense Refill   • psyllium (METAMUCIL SMOOTH TEXTURE) 28 % packet Take 1 Packet by mouth 2 times a day. 100 Each 3   • docusate sodium (COLACE) 100 MG Cap Take 1 Cap by mouth 2 times a day. 90 Cap 0   • Misc. Devices Misc Please provide patient with an elbow air cast (any brand ok) 1 Application 1   • simvastatin (ZOCOR) 40 MG Tab TAKE ONE TABLET BY MOUTH EVERY EVENING 90 Tab 1   • hydrochlorothiazide (HYDRODIURIL) 25 MG Tab TAKE ONE TABLET BY MOUTH DAILY 90 Tab 0   • aspirin (ASA) 81 MG Chew Tab chewable tablet CHEW AND SWALLOW ONE TABLET BY MOUTH DAILY 90 Tab 0   • levothyroxine (SYNTHROID) 100 MCG Tab TAKE ONE TABLET BY MOUTH EVERY MORNING ON AN EMPTY STOMACH 90 Tab 0   • hydrochlorothiazide (HYDRODIURIL) 12.5 MG tablet Take 1 Tab by mouth every day. 60 Tab 0   • timolol (BETIMOL) 0.5 % ophthalmic solution Place 1 Drop in both eyes 2 times a day. use in affected eye(s)   Indications: Increased Pressure Within the Eye     • indomethacin (INDOCIN) 50 MG Cap Take 1 Cap by mouth 3 times a day. 90 Cap 0   • Misc. Devices Misc Please provide patient with shingles vaccine 1 Application 0   • sildenafil citrate  (VIAGRA) 100 MG tablet Take 0.5 Tabs by mouth as needed for Erectile Dysfunction. 30 Tab 3   • Misc. Devices Misc Please provide patient with shingles vaccine 1 Application 0   • Brimonidine Tartrate-Timolol (COMBIGAN) 0.2-0.5 % SOLN by Ophthalmic route.       No current facility-administered medications for this visit.         Patient is taking medications as noted in medication list.  Current supplements as per medication list.     Allergies: Patient has no known allergies.    Current social contact/activities: nothing     Is patient current with immunizations? No, due for FLU and ZOSTAVAX (Shingles). Patient is interested in receiving NONE today.    He  reports that he has never smoked. He has never used smokeless tobacco. He reports that he drinks alcohol. He reports that he does not use drugs.  Counseling given: Not Answered        DPA/Advanced directive: Patient does not have an Advanced Directive.  A packet and workshop information was given on Advanced Directives.    ROS:    Gait: Uses no assistive device   Ostomy: no   Other tubes: no   Amputations: no   Chronic oxygen use no   Last eye exam 08/2017   Wears hearing aids: no   : Denies any urinary leakage during the last 6 months      Screening:        Depression Screening    Little interest or pleasure in doing things?  0 - not at all  Feeling down, depressed, or hopeless? 0 - not at all  Patient Health Questionnaire Score: 0    If depressive symptoms identified deferred to follow up visit unless specifically addressed in assessment and plan.    Interpretation of PHQ-9 Total Score   Score Severity   1-4 No Depression   5-9 Mild Depression   10-14 Moderate Depression   15-19 Moderately Severe Depression   20-27 Severe Depression    Screening for Cognitive Impairment    Three Minute Recall (apple, watch, sol)  3/3 Table/leadership/sunset  3/3  Draw clock face with all 12 numbers set to the hand to show 10 minutes past 11 o'clock  1 5/5  If cognitive  concerns identified, deferred for follow up unless specifically addressed in assessment and plan.    Fall Risk Assessment    Has the patient had two or more falls in the last year or any fall with injury in the last year?  No  If fall risk identified, deferred for follow up unless specifically addressed in assessment and plan.    Safety Assessment    Throw rugs on floor.  Yes  Handrails on all stairs.  No  Good lighting in all hallways.  Yes  Difficulty hearing.  No  Patient counseled about all safety risks that were identified.    Functional Assessment ADLs    Are there any barriers preventing you from cooking for yourself or meeting nutritional needs?  No.    Are there any barriers preventing you from driving safely or obtaining transportation?  No.    Are there any barriers preventing you from using a telephone or calling for help?  No.    Are there any barriers preventing you from shopping?  No.    Are there any barriers preventing you from taking care of your own finances?  No.    Are there any barriers preventing you from managing your medications?  No.    Are you currently engaging any exercise or physical activity?  Yes.   2 times weekly for 1 hr    Health Maintenance Summary                IMM ZOSTER VACCINE Overdue 12/9/2008     Annual Wellness Visit Overdue 4/29/2016      Done 4/29/2015 Visit Dx: Medicare annual wellness visit, subsequent     Patient has more history with this topic...    IMM INFLUENZA Overdue 9/1/2017     COLONOSCOPY Next Due 3/23/2023      Previously completed 3/23/2013 was told clear for 10 more years.    IMM DTaP/Tdap/Td Vaccine Next Due 4/29/2025      Done 4/29/2015 Imm Admin: Tdap Vaccine          Patient Care Team:  Harry Moffett M.D. as PCP - General (Family Medicine)  Romel Díaz M.D. as Consulting Physician (Ophthalmology)    Social History   Substance Use Topics   • Smoking status: Never Smoker   • Smokeless tobacco: Never Used   • Alcohol  "use 0.0 oz/week      Comment: 1 glass a wine 5 days a week.     Family History   Problem Relation Age of Onset   • Heart Attack Mother    • Cancer Father      brain tumor   • Stroke Maternal Grandmother 75   • Heart Disease Paternal Grandmother    • Heart Attack Paternal Grandfather      He  has a past medical history of Thyroid disease.   Past Surgical History:   Procedure Laterality Date   • HERNIA REPAIR             Exam:     Blood pressure 138/82, pulse 76, temperature 36.5 °C (97.7 °F), height 1.702 m (5' 7\"), weight 73.1 kg (161 lb 3.2 oz), SpO2 93 %. Body mass index is 25.25 kg/m².    Hearing excellent.    Dentition good  Alert, oriented in no acute distress.  Eye contact is good, speech goal directed, affect calm        Assessment and Plan. The following treatment and monitoring plan is recommended:    1. Essential hypertension     2. Congenital hypothyroidism without goiter  TSH WITH REFLEX TO FT4   3. Congenital facial asymmetry     4. Hypercholesteremia     5. Slow transit constipation  psyllium (METAMUCIL SMOOTH TEXTURE) 28 % packet    docusate sodium (COLACE) 100 MG Cap   6. Erectile dysfunction due to arterial insufficiency     7. Lateral epicondylitis of left elbow  Misc. Devices Misc   8. Medicare annual wellness visit, initial  Annual Wellness Visit - Includes PPPS Subsequent ()   9. Open-angle glaucoma of left eye, unspecified glaucoma stage, unspecified open-angle glaucoma type           Services suggested: No services needed at this time  Health Care Screening recommendations as per orders if indicated.  Referrals offered: PT/OT/Nutrition counseling/Behavioral Health/Smoking cessation as per orders if indicated.    Discussion today about general wellness and lifestyle habits:    · Prevent falls and reduce trip hazards; Cautioned about securing or removing rugs.  · Have a working fire alarm and carbon monoxide detector;   · Engage in regular physical activity and social activities "       Follow-up: Return in about 1 week (around 12/7/2017) for punch biopsy.

## 2017-12-01 ENCOUNTER — HOSPITAL ENCOUNTER (OUTPATIENT)
Dept: LAB | Facility: MEDICAL CENTER | Age: 67
End: 2017-12-01
Attending: FAMILY MEDICINE
Payer: MEDICARE

## 2017-12-01 LAB — TSH SERPL DL<=0.005 MIU/L-ACNC: 0.58 UIU/ML (ref 0.3–3.7)

## 2017-12-01 PROCEDURE — 84443 ASSAY THYROID STIM HORMONE: CPT

## 2017-12-01 PROCEDURE — 36415 COLL VENOUS BLD VENIPUNCTURE: CPT

## 2017-12-08 RX ORDER — LEVOTHYROXINE SODIUM 0.1 MG/1
TABLET ORAL
Qty: 90 TAB | Refills: 0 | Status: SHIPPED | OUTPATIENT
Start: 2017-12-08 | End: 2018-03-12 | Stop reason: SDUPTHER

## 2017-12-19 ENCOUNTER — TELEPHONE (OUTPATIENT)
Dept: MEDICAL GROUP | Age: 67
End: 2017-12-19

## 2017-12-19 DIAGNOSIS — Z00.00 PREVENTATIVE HEALTH CARE: ICD-10-CM

## 2017-12-19 DIAGNOSIS — I10 ESSENTIAL HYPERTENSION: ICD-10-CM

## 2017-12-19 NOTE — TELEPHONE ENCOUNTER
ESTABLISHED PATIENT PRE-VISIT PLANNING     Note: Patient will not be contacted if there is no indication to call.     1.  Reviewed notes from the last few office visits within the medical group: Yes    2.  If any orders were placed at last visit or intended to be done for this visit (i.e. 6 mos follow-up), do we have Results/Consult Notes?        •  Labs - Labs ordered, completed on 12/1/17 and results are in chart.   Note: If patient appointment is for lab review and patient did not complete labs, check with provider if OK to reschedule patient until labs completed.       •  Imaging - Imaging was not ordered at last office visit.       •  Referrals - No referrals were ordered at last office visit.    3. Is this appointment scheduled as a Hospital Follow-Up? No    4.  Immunizations were updated in Epic using WebIZ?: Epic matches WebIZ       •  Web Iz Recommendations: FLU and ZOSTAVAX (Shingles)    5.  Patient is due for the following Health Maintenance Topics:   Health Maintenance Due   Topic Date Due   • IMM ZOSTER VACCINE  12/09/2008   • IMM INFLUENZA (1) 09/01/2017       - Patient is up-to-date on all Health Maintenance topics. No records have been requested at this time.    6.  Patient was NOT informed to arrive 15 min prior to their scheduled appointment and bring in their medication bottles.

## 2017-12-20 ENCOUNTER — HOSPITAL ENCOUNTER (OUTPATIENT)
Facility: MEDICAL CENTER | Age: 67
End: 2017-12-20
Attending: FAMILY MEDICINE
Payer: MEDICARE

## 2017-12-20 ENCOUNTER — OFFICE VISIT (OUTPATIENT)
Dept: MEDICAL GROUP | Age: 67
End: 2017-12-20
Payer: MEDICARE

## 2017-12-20 VITALS
WEIGHT: 162.8 LBS | SYSTOLIC BLOOD PRESSURE: 140 MMHG | BODY MASS INDEX: 25.55 KG/M2 | OXYGEN SATURATION: 95 % | TEMPERATURE: 98.8 F | HEIGHT: 67 IN | DIASTOLIC BLOOD PRESSURE: 84 MMHG | HEART RATE: 84 BPM

## 2017-12-20 DIAGNOSIS — Z00.00 PREVENTATIVE HEALTH CARE: ICD-10-CM

## 2017-12-20 DIAGNOSIS — I10 ESSENTIAL HYPERTENSION: ICD-10-CM

## 2017-12-20 DIAGNOSIS — D48.9 NEOPLASM OF UNCERTAIN BEHAVIOR: ICD-10-CM

## 2017-12-20 PROCEDURE — 12031 INTMD RPR S/A/T/EXT 2.5 CM/<: CPT | Performed by: FAMILY MEDICINE

## 2017-12-20 PROCEDURE — 88305 TISSUE EXAM BY PATHOLOGIST: CPT

## 2017-12-20 PROCEDURE — 11401 EXC TR-EXT B9+MARG 0.6-1 CM: CPT | Performed by: FAMILY MEDICINE

## 2017-12-20 RX ORDER — HYDROCHLOROTHIAZIDE 25 MG/1
TABLET ORAL
Qty: 90 TAB | Refills: 0 | Status: SHIPPED | OUTPATIENT
Start: 2017-12-20 | End: 2018-03-26 | Stop reason: SDUPTHER

## 2017-12-20 RX ORDER — ASPIRIN 81 MG/1
TABLET, CHEWABLE ORAL
Qty: 90 TAB | Refills: 0 | Status: SHIPPED | OUTPATIENT
Start: 2017-12-20 | End: 2017-12-20 | Stop reason: SDUPTHER

## 2017-12-20 RX ORDER — HYDROCHLOROTHIAZIDE 25 MG/1
TABLET ORAL
Qty: 90 TAB | Refills: 0 | Status: SHIPPED | OUTPATIENT
Start: 2017-12-20 | End: 2017-12-20 | Stop reason: SDUPTHER

## 2017-12-20 RX ORDER — ASPIRIN 81 MG/1
TABLET, CHEWABLE ORAL
Qty: 360 TAB | Refills: 0 | Status: SHIPPED | OUTPATIENT
Start: 2017-12-20 | End: 2018-03-26 | Stop reason: SDUPTHER

## 2017-12-20 NOTE — ASSESSMENT & PLAN NOTE
We  noticed a  new, growing,hyperpigmented, irregular,  macule on  routine skin exam. Patient states that it may have changed, his wife is very concerned and would like it removed.

## 2018-01-03 ENCOUNTER — TELEPHONE (OUTPATIENT)
Dept: MEDICAL GROUP | Age: 68
End: 2018-01-03

## 2018-01-03 NOTE — TELEPHONE ENCOUNTER
ESTABLISHED PATIENT PRE-VISIT PLANNING     Note: Patient will not be contacted if there is no indication to call.     1.  Reviewed notes from the last few office visits within the medical group: Yes    2.  If any orders were placed at last visit or intended to be done for this visit (i.e. 6 mos follow-up), do we have Results/Consult Notes?        •  Labs - Labs ordered, completed on 12/22/17 and results are in chart.   Note: If patient appointment is for lab review and patient did not complete labs, check with provider if OK to reschedule patient until labs completed.       •  Imaging - Imaging was not ordered at last office visit.       •  Referrals - No referrals were ordered at last office visit.    3. Is this appointment scheduled as a Hospital Follow-Up? No    4.  Immunizations were updated in Epic using WebIZ?: Epic matches WebIZ       •  Web Iz Recommendations: FLU and ZOSTAVAX (Shingles)    5.  Patient is due for the following Health Maintenance Topics:   Health Maintenance Due   Topic Date Due   • IMM ZOSTER VACCINE  12/09/2008   • IMM INFLUENZA (1) 09/01/2017       - Patient is up-to-date on all Health Maintenance topics. No records have been requested at this time.    6.  Patient was NOT informed to arrive 15 min prior to their scheduled appointment and bring in their medication bottles.

## 2018-01-04 ENCOUNTER — OFFICE VISIT (OUTPATIENT)
Dept: MEDICAL GROUP | Age: 68
End: 2018-01-04
Payer: MEDICARE

## 2018-01-04 VITALS
SYSTOLIC BLOOD PRESSURE: 124 MMHG | DIASTOLIC BLOOD PRESSURE: 70 MMHG | HEART RATE: 84 BPM | OXYGEN SATURATION: 96 % | HEIGHT: 67 IN | BODY MASS INDEX: 25.46 KG/M2 | TEMPERATURE: 98.8 F | WEIGHT: 162.2 LBS

## 2018-01-04 DIAGNOSIS — N40.0 BENIGN PROSTATIC HYPERPLASIA WITHOUT LOWER URINARY TRACT SYMPTOMS: ICD-10-CM

## 2018-01-04 DIAGNOSIS — E03.1 CONGENITAL HYPOTHYROIDISM WITHOUT GOITER: ICD-10-CM

## 2018-01-04 DIAGNOSIS — I10 ESSENTIAL HYPERTENSION: ICD-10-CM

## 2018-01-04 DIAGNOSIS — Z48.02 VISIT FOR SUTURE REMOVAL: ICD-10-CM

## 2018-01-04 DIAGNOSIS — E78.00 HYPERCHOLESTEREMIA: ICD-10-CM

## 2018-01-04 PROCEDURE — 99214 OFFICE O/P EST MOD 30 MIN: CPT | Performed by: FAMILY MEDICINE

## 2018-01-04 NOTE — ASSESSMENT & PLAN NOTE
Simvastatin 40mg     Still not at gaol but is reluctant to increase dose.   The patient has been on a statin for years and tolerating this fine. The patient denies any muscle aches, no abdominal pain and no history of elevated liver enzymes.      Results for BRIGIDODANNIE (MRN [id number]) as of 1/4/2018 10:01   Ref. Range 2/23/2017 08:06   Cholesterol,Tot Latest Ref Range: 100 - 199 mg/dL 240 (H)   Triglycerides Latest Ref Range: 0 - 149 mg/dL 139   HDL Latest Ref Range: >=40 mg/dL 61   LDL Latest Ref Range: <100 mg/dL 151 (H)

## 2018-01-04 NOTE — ASSESSMENT & PLAN NOTE
HCTZ 25mg     On a baby ASA  On a statin    The patient has been tollerating the BP meds without any issues. No tunnel vision, no cough, no changes in vision, no lightheadedness, no fatigue, no syncopal or presyncopal episodes, no edema, no new rashes.

## 2018-01-05 ENCOUNTER — HOSPITAL ENCOUNTER (OUTPATIENT)
Dept: LAB | Facility: MEDICAL CENTER | Age: 68
End: 2018-01-05
Attending: FAMILY MEDICINE
Payer: MEDICARE

## 2018-01-05 DIAGNOSIS — N40.0 BENIGN PROSTATIC HYPERPLASIA WITHOUT LOWER URINARY TRACT SYMPTOMS: ICD-10-CM

## 2018-01-05 DIAGNOSIS — E78.00 HYPERCHOLESTEREMIA: ICD-10-CM

## 2018-01-05 DIAGNOSIS — E03.1 CONGENITAL HYPOTHYROIDISM WITHOUT GOITER: ICD-10-CM

## 2018-01-05 PROBLEM — Z48.02 VISIT FOR SUTURE REMOVAL: Status: ACTIVE | Noted: 2018-01-05

## 2018-01-05 LAB
ALBUMIN SERPL BCP-MCNC: 4.2 G/DL (ref 3.2–4.9)
ALBUMIN/GLOB SERPL: 1.4 G/DL
ALP SERPL-CCNC: 45 U/L (ref 30–99)
ALT SERPL-CCNC: 33 U/L (ref 2–50)
ANION GAP SERPL CALC-SCNC: 9 MMOL/L (ref 0–11.9)
AST SERPL-CCNC: 29 U/L (ref 12–45)
BASOPHILS # BLD AUTO: 0.8 % (ref 0–1.8)
BASOPHILS # BLD: 0.05 K/UL (ref 0–0.12)
BILIRUB SERPL-MCNC: 0.4 MG/DL (ref 0.1–1.5)
BUN SERPL-MCNC: 22 MG/DL (ref 8–22)
CALCIUM SERPL-MCNC: 9.6 MG/DL (ref 8.5–10.5)
CHLORIDE SERPL-SCNC: 103 MMOL/L (ref 96–112)
CHOLEST SERPL-MCNC: 205 MG/DL (ref 100–199)
CO2 SERPL-SCNC: 26 MMOL/L (ref 20–33)
CREAT SERPL-MCNC: 0.9 MG/DL (ref 0.5–1.4)
EOSINOPHIL # BLD AUTO: 0.38 K/UL (ref 0–0.51)
EOSINOPHIL NFR BLD: 6.4 % (ref 0–6.9)
ERYTHROCYTE [DISTWIDTH] IN BLOOD BY AUTOMATED COUNT: 39.5 FL (ref 35.9–50)
GFR SERPL CREATININE-BSD FRML MDRD: >60 ML/MIN/1.73 M 2
GLOBULIN SER CALC-MCNC: 3.1 G/DL (ref 1.9–3.5)
GLUCOSE SERPL-MCNC: 100 MG/DL (ref 65–99)
HCT VFR BLD AUTO: 44.5 % (ref 42–52)
HDLC SERPL-MCNC: 63 MG/DL
HGB BLD-MCNC: 15.4 G/DL (ref 14–18)
IMM GRANULOCYTES # BLD AUTO: 0.04 K/UL (ref 0–0.11)
IMM GRANULOCYTES NFR BLD AUTO: 0.7 % (ref 0–0.9)
LDLC SERPL CALC-MCNC: 112 MG/DL
LYMPHOCYTES # BLD AUTO: 1.43 K/UL (ref 1–4.8)
LYMPHOCYTES NFR BLD: 24.1 % (ref 22–41)
MCH RBC QN AUTO: 31 PG (ref 27–33)
MCHC RBC AUTO-ENTMCNC: 34.6 G/DL (ref 33.7–35.3)
MCV RBC AUTO: 89.7 FL (ref 81.4–97.8)
MONOCYTES # BLD AUTO: 0.85 K/UL (ref 0–0.85)
MONOCYTES NFR BLD AUTO: 14.3 % (ref 0–13.4)
NEUTROPHILS # BLD AUTO: 3.18 K/UL (ref 1.82–7.42)
NEUTROPHILS NFR BLD: 53.7 % (ref 44–72)
NRBC # BLD AUTO: 0 K/UL
NRBC BLD-RTO: 0 /100 WBC
PLATELET # BLD AUTO: 273 K/UL (ref 164–446)
PMV BLD AUTO: 9.7 FL (ref 9–12.9)
POTASSIUM SERPL-SCNC: 4.1 MMOL/L (ref 3.6–5.5)
PROT SERPL-MCNC: 7.3 G/DL (ref 6–8.2)
PSA SERPL-MCNC: 4.72 NG/ML (ref 0–4)
RBC # BLD AUTO: 4.96 M/UL (ref 4.7–6.1)
SODIUM SERPL-SCNC: 138 MMOL/L (ref 135–145)
TRIGL SERPL-MCNC: 149 MG/DL (ref 0–149)
TSH SERPL DL<=0.005 MIU/L-ACNC: 1.23 UIU/ML (ref 0.38–5.33)
WBC # BLD AUTO: 5.9 K/UL (ref 4.8–10.8)

## 2018-01-05 PROCEDURE — 80053 COMPREHEN METABOLIC PANEL: CPT

## 2018-01-05 PROCEDURE — 36415 COLL VENOUS BLD VENIPUNCTURE: CPT

## 2018-01-05 PROCEDURE — 80061 LIPID PANEL: CPT

## 2018-01-05 PROCEDURE — 84443 ASSAY THYROID STIM HORMONE: CPT

## 2018-01-05 PROCEDURE — 84153 ASSAY OF PSA TOTAL: CPT

## 2018-01-05 PROCEDURE — 85025 COMPLETE CBC W/AUTO DIFF WBC: CPT

## 2018-01-05 NOTE — PROGRESS NOTES
This medical record contains text that has been entered with the assistance of computer voice recognition and dictation software.  Therefore, it may contain unintended errors in text, spelling, punctuation, or grammar    Chief Complaint   Patient presents with   • Other     see reason for visit       Dannie Fofana is a 67 y.o. male here evaluation and management of:       HPI:     Hypercholesteremia  Simvastatin 40mg     Still not at gaol but is reluctant to increase dose.   The patient has been on a statin for years and tolerating this fine. The patient denies any muscle aches, no abdominal pain and no history of elevated liver enzymes.      Results for DANNIE FOFANA (MRN [id number]) as of 1/4/2018 10:01   Ref. Range 2/23/2017 08:06   Cholesterol,Tot Latest Ref Range: 100 - 199 mg/dL 240 (H)   Triglycerides Latest Ref Range: 0 - 149 mg/dL 139   HDL Latest Ref Range: >=40 mg/dL 61   LDL Latest Ref Range: <100 mg/dL 151 (H)       Essential hypertension  HCTZ 25mg     On a baby ASA  On a statin    The patient has been tollerating the BP meds without any issues. No tunnel vision, no cough, no changes in vision, no lightheadedness, no fatigue, no syncopal or presyncopal episodes, no edema, no new rashes.     Visit for suture removal    The patient underwent excision and returns for suture removal.                FINAL DIAGNOSIS:    A. Right mid abdomen:         Junctional melanocytic nevus, not present on margins.         Negative for malignancy.                                          Diagnosis performed by:                                      FERNIE AMBROSIO MD  ------------------------------------------------------------------------  --      Current medicines (including changes today)  Current Outpatient Prescriptions   Medication Sig Dispense Refill   • hydrochlorothiazide (HYDRODIURIL) 25 MG Tab TAKE ONE TABLET BY MOUTH DAILY 90 Tab 0   • aspirin (ASA) 81 MG Chew Tab chewable tablet CHEW AND SWALLOW ONE  TABLET BY MOUTH DAILY 360 Tab 0   • levothyroxine (SYNTHROID) 100 MCG Tab TAKE ONE TABLET BY MOUTH EVERY MORNING ON AN EMPTY STOMACH 90 Tab 0   • simvastatin (ZOCOR) 40 MG Tab TAKE ONE TABLET BY MOUTH EVERY EVENING 90 Tab 1   • timolol (BETIMOL) 0.5 % ophthalmic solution Place 1 Drop in both eyes 2 times a day. use in affected eye(s)   Indications: Increased Pressure Within the Eye     • psyllium (METAMUCIL SMOOTH TEXTURE) 28 % packet Take 1 Packet by mouth 2 times a day. 100 Each 3   • docusate sodium (COLACE) 100 MG Cap Take 1 Cap by mouth 2 times a day. 90 Cap 0   • Misc. Devices Misc Please provide patient with an elbow air cast (any brand ok) 1 Application 1   • indomethacin (INDOCIN) 50 MG Cap Take 1 Cap by mouth 3 times a day. 90 Cap 0   • Misc. Devices Misc Please provide patient with shingles vaccine 1 Application 0   • sildenafil citrate (VIAGRA) 100 MG tablet Take 0.5 Tabs by mouth as needed for Erectile Dysfunction. 30 Tab 3   • Misc. Devices Misc Please provide patient with shingles vaccine 1 Application 0   • Brimonidine Tartrate-Timolol (COMBIGAN) 0.2-0.5 % SOLN by Ophthalmic route.       No current facility-administered medications for this visit.      He  has a past medical history of Thyroid disease.  He  has a past surgical history that includes hernia repair.  Social History   Substance Use Topics   • Smoking status: Never Smoker   • Smokeless tobacco: Never Used   • Alcohol use 0.0 oz/week      Comment: 1 glass a wine 5 days a week.     Social History     Social History Narrative   • No narrative on file     Family History   Problem Relation Age of Onset   • Heart Attack Mother    • Cancer Father      brain tumor   • Stroke Maternal Grandmother 75   • Heart Disease Paternal Grandmother    • Heart Attack Paternal Grandfather      Family Status   Relation Status   • Mother    • Father    • Brother Alive   • Maternal Grandmother    • Maternal Grandfather    •  "Paternal Grandmother    • Paternal Grandfather          ROS    Please see hpi     All other systems reviewed and are negative     Objective:     Blood pressure 124/70, pulse 84, temperature 37.1 °C (98.8 °F), height 1.702 m (5' 7.01\"), weight 73.6 kg (162 lb 3.2 oz), SpO2 96 %. Body mass index is 25.4 kg/m².  Physical Exam:    Constitutional: Alert, no distress.  Skin: Warm, dry, good turgor, no rashes in visible areas.  Eye: Equal, round and reactive, conjunctiva clear, lids normal.  ENMT: Lips without lesions, good dentition, oropharynx clear.  Neck: Trachea midline, no masses, no thyromegaly. No cervical or supraclavicular lymphadenopathy.  Respiratory: Unlabored respiratory effort, lungs clear to auscultation, no wheezes, no ronchi.  Cardiovascular: Normal S1, S2, no murmur, no edema.  Abdomen: Soft, non-tender, no masses, no hepatosplenomegaly.  Psych: Alert and oriented x3, normal affect and mood.          Assessment and Plan:   The following treatment plan was discussed      1. Hypercholesteremia    Due for repeat labs    - COMP METABOLIC PANEL; Future  - LIPID PROFILE; Future  - CBC WITH DIFFERENTIAL; Future    2. Congenital hypothyroidism without goiter  Repeat labs    - TSH WITH REFLEX TO FT4; Future    3. Essential hypertension  Patient has been stable with current management  We will make no changes for now      4. Benign prostatic hyperplasia without lower urinary tract symptoms    - PROSTATE SPECIFIC AG DIAGNOSTIC; Future    5. Visit for suture removal    Sutures removed in normal clean fashion.  There were no adverse events.  We also discussed results of pathology  Peak sun hours are 10am to 4pm  And future skin examinations along   With self skin exams--which have shown to decrease  Melanoma incidence by 63 %            HEALTH MAINTENANCE:    Instructed to Follow up in clinic or ER for worsening symptoms, difficulty breathing, lack of expected recovery, or should new symptoms or " problems arise.    Followup: Return in about 4 weeks (around 2/1/2018) for EXCISION.       Once again this medical record contains text that has been entered with the assistance of computer voice recognition and dictation software.  Therefore, it may contain unintended errors in text, spelling, punctuation, or grammar

## 2018-01-05 NOTE — ASSESSMENT & PLAN NOTE
The patient underwent excision and returns for suture removal.                FINAL DIAGNOSIS:    A. Right mid abdomen:         Junctional melanocytic nevus, not present on margins.         Negative for malignancy.                                          Diagnosis performed by:                                      FERNIE AMBROSIO MD  ------------------------------------------------------------------------  --

## 2018-01-09 ENCOUNTER — APPOINTMENT (OUTPATIENT)
Dept: OTHER | Facility: IMAGING CENTER | Age: 68
End: 2018-01-09

## 2018-02-06 ENCOUNTER — HOSPITAL ENCOUNTER (OUTPATIENT)
Facility: MEDICAL CENTER | Age: 68
End: 2018-02-06
Attending: FAMILY MEDICINE
Payer: MEDICARE

## 2018-02-06 ENCOUNTER — OFFICE VISIT (OUTPATIENT)
Dept: MEDICAL GROUP | Age: 68
End: 2018-02-06
Payer: MEDICARE

## 2018-02-06 VITALS
HEART RATE: 82 BPM | TEMPERATURE: 98.4 F | SYSTOLIC BLOOD PRESSURE: 122 MMHG | HEIGHT: 67 IN | OXYGEN SATURATION: 95 % | WEIGHT: 162.8 LBS | BODY MASS INDEX: 25.55 KG/M2 | DIASTOLIC BLOOD PRESSURE: 72 MMHG

## 2018-02-06 DIAGNOSIS — D48.9 NEOPLASM OF UNCERTAIN BEHAVIOR: ICD-10-CM

## 2018-02-06 DIAGNOSIS — R97.20 ELEVATED PSA: ICD-10-CM

## 2018-02-06 PROCEDURE — 12031 INTMD RPR S/A/T/EXT 2.5 CM/<: CPT | Performed by: FAMILY MEDICINE

## 2018-02-06 PROCEDURE — 11401 EXC TR-EXT B9+MARG 0.6-1 CM: CPT | Performed by: FAMILY MEDICINE

## 2018-02-06 PROCEDURE — 88305 TISSUE EXAM BY PATHOLOGIST: CPT

## 2018-02-07 NOTE — ASSESSMENT & PLAN NOTE
The patient had complained about a new hyperpigmented macule which could have been growing. It also bleeds when scratched.

## 2018-02-07 NOTE — PROGRESS NOTES
This medical record contains text that has been entered with the assistance of computer voice recognition and dictation software.  Therefore, it may contain unintended errors in text, spelling, punctuation, or grammar    Chief Complaint   Patient presents with   • Biopsy     skin biopsy       Yovany Estes is a 67 y.o. male here evaluation and management of: NUB      HPI:     Neoplasm of uncertain behavior  The patient had complained about a new hyperpigmented macule which could have been growing. It also bleeds when scratched.    Current medicines (including changes today)  Current Outpatient Prescriptions   Medication Sig Dispense Refill   • hydrochlorothiazide (HYDRODIURIL) 25 MG Tab TAKE ONE TABLET BY MOUTH DAILY 90 Tab 0   • aspirin (ASA) 81 MG Chew Tab chewable tablet CHEW AND SWALLOW ONE TABLET BY MOUTH DAILY 360 Tab 0   • levothyroxine (SYNTHROID) 100 MCG Tab TAKE ONE TABLET BY MOUTH EVERY MORNING ON AN EMPTY STOMACH 90 Tab 0   • psyllium (METAMUCIL SMOOTH TEXTURE) 28 % packet Take 1 Packet by mouth 2 times a day. 100 Each 3   • docusate sodium (COLACE) 100 MG Cap Take 1 Cap by mouth 2 times a day. 90 Cap 0   • Misc. Devices Misc Please provide patient with an elbow air cast (any brand ok) 1 Application 1   • simvastatin (ZOCOR) 40 MG Tab TAKE ONE TABLET BY MOUTH EVERY EVENING 90 Tab 1   • indomethacin (INDOCIN) 50 MG Cap Take 1 Cap by mouth 3 times a day. 90 Cap 0   • Misc. Devices Misc Please provide patient with shingles vaccine 1 Application 0   • sildenafil citrate (VIAGRA) 100 MG tablet Take 0.5 Tabs by mouth as needed for Erectile Dysfunction. 30 Tab 3   • Misc. Devices Misc Please provide patient with shingles vaccine 1 Application 0   • Brimonidine Tartrate-Timolol (COMBIGAN) 0.2-0.5 % SOLN by Ophthalmic route.     • timolol (BETIMOL) 0.5 % ophthalmic solution Place 1 Drop in both eyes 2 times a day. use in affected eye(s)   Indications: Increased Pressure Within the Eye       No current  "facility-administered medications for this visit.      He  has a past medical history of Thyroid disease.  He  has a past surgical history that includes hernia repair.  Social History   Substance Use Topics   • Smoking status: Never Smoker   • Smokeless tobacco: Never Used   • Alcohol use 0.0 oz/week      Comment: 1 glass a wine 5 days a week.     Social History     Social History Narrative   • No narrative on file     Family History   Problem Relation Age of Onset   • Heart Attack Mother    • Cancer Father      brain tumor   • Stroke Maternal Grandmother 75   • Heart Disease Paternal Grandmother    • Heart Attack Paternal Grandfather      Family Status   Relation Status   • Mother    • Father    • Brother Alive   • Maternal Grandmother    • Maternal Grandfather    • Paternal Grandmother    • Paternal Grandfather          ROS    Please see hpi     All other systems reviewed and are negative     Objective:     Blood pressure 122/72, pulse 82, temperature 36.9 °C (98.4 °F), height 1.702 m (5' 7.01\"), weight 73.8 kg (162 lb 12.8 oz), SpO2 95 %. Body mass index is 25.49 kg/m².  Physical Exam:    Constitutional: Alert, no distress.  Skin: Warm, dry, good turgor, no rashes in visible areas.  Eye: Equal, round and reactive, conjunctiva clear, lids normal.  ENMT: Lips without lesions, good dentition, oropharynx clear.  Neck: Trachea midline, no masses, no thyromegaly. No cervical or supraclavicular lymphadenopathy.  Respiratory: Unlabored respiratory effort, lungs clear to auscultation, no wheezes, no ronchi.  Cardiovascular: Normal S1, S2, no murmur, no edema.  Abdomen: Soft, non-tender, no masses, no hepatosplenomegaly.  Psych: Alert and oriented x3, normal affect and mood.        Excision---6 mm, irregular, assymmetric, hyperpigmented macule located on left upper abdomen    Dermoscopy revealed  , abnormal vessels, blotches,     Final Length of Excision--8mm        After " informed written consent was obtained, using Betadine for cleansing and 1% Lidocaine w- epinephrine for anesthetic, with sterile technique a 6 mm punch tool was used to obtain and excise  the lesion through dermis (full thickness) . Intent was to remove entire lesion with margins . Hemostasis was obtained by pressure and wound was   Sutured  With 3.0 Ethilon x3 Plus Vycryl x. ( one in intermediate layer closure) Antibiotic dressing is applied, and wound care instructions provided. Be alert for any signs of cutaneous infection. The specimen is labeled and sent to pathology for evaluation. The procedure was well tolerated without complications.    Intermediate layer closure    The needle was inserted in the dermis and directed toward the skin surface, exiting near the dermal-epidermal junction on the same side.   then the needle was inserted on the opposite side of the wound near the dermal-epidermal junction, directly across from the point of exit.  The suture loop was completed in the dermis, directly opposite the origin of the loop, and the knot tied.        Assessment and Plan:   The following treatment plan was discussed      1. Elevated PSA    - REFERRAL TO UROLOGY    2. Neoplasm of uncertain behavior  Patient tollerrated procedure well  There were no adverse events  Patient was given post procedure precautions     - PATHOLOGY SPECIMEN; Future        HEALTH MAINTENANCE:    Instructed to Follow up in clinic or ER for worsening symptoms, difficulty breathing, lack of expected recovery, or should new symptoms or problems arise.    Followup: Return in about 2 weeks (around 2/20/2018) for Suture Removal with MA.       Once again this medical record contains text that has been entered with the assistance of computer voice recognition and dictation software.  Therefore, it may contain unintended errors in text, spelling, punctuation, or grammar

## 2018-02-13 ENCOUNTER — NON-PROVIDER VISIT (OUTPATIENT)
Dept: MEDICAL GROUP | Age: 68
End: 2018-02-13
Payer: MEDICARE

## 2018-02-13 NOTE — PROGRESS NOTES
Yovany Estes is a 67 y.o. male here for a Non-Provider Visit for Suture Removal.    Sutures were placed by Dr. Morel on date: 2/6/18  Skin is healed: Yes  Provider notified if skin is not healed, or if there is redness, heat, pain, or drainage from incision: N\A  Sutures removed.   Mastisol and steristips are placed: No    Advised to use emollient (vaseline, aquaphor, etc.) as needed, avoid peroxide and antibiotic ointment to reduce irritation.     Path report has been reviewed by provider.  Path report has reviewed with patient.

## 2018-03-09 ENCOUNTER — HOSPITAL ENCOUNTER (OUTPATIENT)
Dept: LAB | Facility: MEDICAL CENTER | Age: 68
End: 2018-03-09
Attending: UROLOGY
Payer: MEDICARE

## 2018-03-09 PROCEDURE — 84153 ASSAY OF PSA TOTAL: CPT

## 2018-03-09 PROCEDURE — 36415 COLL VENOUS BLD VENIPUNCTURE: CPT

## 2018-03-09 PROCEDURE — 84154 ASSAY OF PSA FREE: CPT

## 2018-03-10 LAB
PSA FREE MFR SERPL: 19 %
PSA FREE SERPL-MCNC: 0.6 NG/ML
PSA SERPL-MCNC: 3.2 NG/ML (ref 0–4)

## 2018-03-12 NOTE — TELEPHONE ENCOUNTER
Was the patient seen in the last year in this department? {Yes/No:20266}    Does patient have an active prescription for medications requested? {Yes/No:20266}    Received Request Via: {REFILL PATIENT/PHARMACY:[id number]}

## 2018-03-13 RX ORDER — LEVOTHYROXINE SODIUM 0.1 MG/1
TABLET ORAL
Qty: 90 TAB | Refills: 0 | Status: SHIPPED | OUTPATIENT
Start: 2018-03-13 | End: 2018-06-06 | Stop reason: SDUPTHER

## 2018-03-26 DIAGNOSIS — I10 ESSENTIAL HYPERTENSION: ICD-10-CM

## 2018-03-26 DIAGNOSIS — Z00.00 PREVENTATIVE HEALTH CARE: ICD-10-CM

## 2018-03-27 RX ORDER — ASPIRIN 81 MG/1
TABLET, CHEWABLE ORAL
Qty: 360 TAB | Refills: 0 | Status: SHIPPED | OUTPATIENT
Start: 2018-03-27 | End: 2018-06-12 | Stop reason: SDUPTHER

## 2018-03-27 RX ORDER — HYDROCHLOROTHIAZIDE 25 MG/1
TABLET ORAL
Qty: 90 TAB | Refills: 0 | Status: SHIPPED | OUTPATIENT
Start: 2018-03-27 | End: 2018-07-05 | Stop reason: SDUPTHER

## 2018-06-06 DIAGNOSIS — E03.9 HYPOTHYROIDISM, UNSPECIFIED TYPE: ICD-10-CM

## 2018-06-07 RX ORDER — LEVOTHYROXINE SODIUM 0.1 MG/1
TABLET ORAL
Qty: 90 TAB | Refills: 0 | Status: SHIPPED | OUTPATIENT
Start: 2018-06-07 | End: 2018-09-17 | Stop reason: SDUPTHER

## 2018-06-08 DIAGNOSIS — E78.00 HYPERCHOLESTEREMIA: ICD-10-CM

## 2018-06-08 RX ORDER — SIMVASTATIN 40 MG
40 TABLET ORAL EVERY EVENING
Qty: 90 TAB | Refills: 0 | Status: SHIPPED | OUTPATIENT
Start: 2018-06-08 | End: 2018-09-26 | Stop reason: SDUPTHER

## 2018-06-12 DIAGNOSIS — Z00.00 PREVENTATIVE HEALTH CARE: ICD-10-CM

## 2018-06-14 RX ORDER — ASPIRIN 81 MG/1
TABLET, CHEWABLE ORAL
Qty: 360 TAB | Refills: 0 | Status: SHIPPED | OUTPATIENT
Start: 2018-06-14 | End: 2018-09-26 | Stop reason: SDUPTHER

## 2018-06-19 DIAGNOSIS — M10.9 ACUTE GOUT INVOLVING TOE OF RIGHT FOOT, UNSPECIFIED CAUSE: ICD-10-CM

## 2018-06-19 RX ORDER — INDOMETHACIN 50 MG/1
50 CAPSULE ORAL 3 TIMES DAILY PRN
Qty: 90 CAP | Refills: 2 | Status: SHIPPED | OUTPATIENT
Start: 2018-06-19 | End: 2021-05-21

## 2018-07-05 DIAGNOSIS — I10 ESSENTIAL HYPERTENSION: ICD-10-CM

## 2018-07-06 RX ORDER — HYDROCHLOROTHIAZIDE 25 MG/1
TABLET ORAL
Qty: 90 TAB | Refills: 0 | Status: SHIPPED | OUTPATIENT
Start: 2018-07-06 | End: 2018-10-10 | Stop reason: SDUPTHER

## 2018-09-17 DIAGNOSIS — E03.9 HYPOTHYROIDISM, UNSPECIFIED TYPE: ICD-10-CM

## 2018-09-18 RX ORDER — LEVOTHYROXINE SODIUM 0.1 MG/1
TABLET ORAL
Qty: 90 TAB | Refills: 1 | Status: SHIPPED | OUTPATIENT
Start: 2018-09-18 | End: 2019-04-04 | Stop reason: SDUPTHER

## 2018-09-26 DIAGNOSIS — E78.00 HYPERCHOLESTEREMIA: ICD-10-CM

## 2018-09-26 DIAGNOSIS — Z00.00 PREVENTATIVE HEALTH CARE: ICD-10-CM

## 2018-09-27 RX ORDER — ASPIRIN 81 MG/1
TABLET, CHEWABLE ORAL
Qty: 360 TAB | Refills: 0 | Status: SHIPPED | OUTPATIENT
Start: 2018-09-27 | End: 2019-07-01 | Stop reason: SDUPTHER

## 2018-09-27 RX ORDER — SIMVASTATIN 40 MG
40 TABLET ORAL EVERY EVENING
Qty: 90 TAB | Refills: 0 | Status: SHIPPED | OUTPATIENT
Start: 2018-09-27 | End: 2018-12-31 | Stop reason: SDUPTHER

## 2018-10-10 DIAGNOSIS — I10 ESSENTIAL HYPERTENSION: ICD-10-CM

## 2018-10-11 RX ORDER — HYDROCHLOROTHIAZIDE 25 MG/1
TABLET ORAL
Qty: 90 TAB | Refills: 0 | Status: SHIPPED | OUTPATIENT
Start: 2018-10-11 | End: 2019-01-16 | Stop reason: SDUPTHER

## 2018-12-31 DIAGNOSIS — E78.00 HYPERCHOLESTEREMIA: ICD-10-CM

## 2018-12-31 RX ORDER — SIMVASTATIN 40 MG
TABLET ORAL
Qty: 90 TAB | Refills: 0 | Status: SHIPPED | OUTPATIENT
Start: 2018-12-31 | End: 2019-04-04 | Stop reason: SDUPTHER

## 2019-01-16 DIAGNOSIS — I10 ESSENTIAL HYPERTENSION: ICD-10-CM

## 2019-01-16 RX ORDER — HYDROCHLOROTHIAZIDE 25 MG/1
TABLET ORAL
Qty: 90 TAB | Refills: 0 | Status: SHIPPED | OUTPATIENT
Start: 2019-01-16 | End: 2019-04-05 | Stop reason: SDUPTHER

## 2019-04-04 DIAGNOSIS — E78.00 HYPERCHOLESTEREMIA: ICD-10-CM

## 2019-04-04 DIAGNOSIS — I10 ESSENTIAL HYPERTENSION: ICD-10-CM

## 2019-04-04 DIAGNOSIS — E03.9 HYPOTHYROIDISM, UNSPECIFIED TYPE: ICD-10-CM

## 2019-04-04 RX ORDER — LEVOTHYROXINE SODIUM 0.1 MG/1
TABLET ORAL
Qty: 90 TAB | Refills: 1 | Status: SHIPPED | OUTPATIENT
Start: 2019-04-04 | End: 2019-10-17 | Stop reason: SDUPTHER

## 2019-04-04 RX ORDER — HYDROCHLOROTHIAZIDE 25 MG/1
TABLET ORAL
Qty: 90 TAB | Refills: 0 | Status: CANCELLED | OUTPATIENT
Start: 2019-04-04

## 2019-04-05 DIAGNOSIS — I10 ESSENTIAL HYPERTENSION: ICD-10-CM

## 2019-04-05 RX ORDER — HYDROCHLOROTHIAZIDE 25 MG/1
TABLET ORAL
Qty: 90 TAB | Refills: 1 | Status: SHIPPED | OUTPATIENT
Start: 2019-04-05 | End: 2019-11-11 | Stop reason: SDUPTHER

## 2019-04-09 RX ORDER — SIMVASTATIN 40 MG
40 TABLET ORAL EVERY EVENING
Qty: 90 TAB | Refills: 0 | Status: SHIPPED | OUTPATIENT
Start: 2019-04-09 | End: 2019-08-12 | Stop reason: SDUPTHER

## 2019-07-01 DIAGNOSIS — Z00.00 PREVENTATIVE HEALTH CARE: ICD-10-CM

## 2019-07-02 RX ORDER — ASPIRIN 81 MG/1
TABLET, CHEWABLE ORAL
Qty: 90 TAB | Refills: 0 | Status: SHIPPED | OUTPATIENT
Start: 2019-07-02 | End: 2019-10-16 | Stop reason: SDUPTHER

## 2019-08-12 DIAGNOSIS — E78.00 HYPERCHOLESTEREMIA: ICD-10-CM

## 2019-08-13 RX ORDER — SIMVASTATIN 40 MG
TABLET ORAL
Qty: 90 TAB | Refills: 2 | Status: SHIPPED | OUTPATIENT
Start: 2019-08-13 | End: 2019-10-24

## 2019-09-30 ENCOUNTER — HOSPITAL ENCOUNTER (OUTPATIENT)
Dept: LAB | Facility: MEDICAL CENTER | Age: 69
End: 2019-09-30
Attending: OPHTHALMOLOGY
Payer: MEDICARE

## 2019-09-30 LAB
BUN SERPL-MCNC: 22 MG/DL (ref 8–22)
CREAT SERPL-MCNC: 1.03 MG/DL (ref 0.5–1.4)

## 2019-09-30 PROCEDURE — 84520 ASSAY OF UREA NITROGEN: CPT

## 2019-09-30 PROCEDURE — 82565 ASSAY OF CREATININE: CPT

## 2019-09-30 PROCEDURE — 36415 COLL VENOUS BLD VENIPUNCTURE: CPT

## 2019-10-16 DIAGNOSIS — Z00.00 PREVENTATIVE HEALTH CARE: ICD-10-CM

## 2019-10-17 DIAGNOSIS — E03.9 HYPOTHYROIDISM, UNSPECIFIED TYPE: ICD-10-CM

## 2019-10-17 RX ORDER — LEVOTHYROXINE SODIUM 0.1 MG/1
TABLET ORAL
Qty: 90 TAB | Refills: 1 | Status: SHIPPED | OUTPATIENT
Start: 2019-10-17 | End: 2020-01-24 | Stop reason: SDUPTHER

## 2019-10-17 RX ORDER — ASPIRIN 81 MG/1
TABLET, CHEWABLE ORAL
Qty: 90 TAB | Refills: 0 | Status: SHIPPED | OUTPATIENT
Start: 2019-10-17 | End: 2020-01-24 | Stop reason: SDUPTHER

## 2019-10-17 NOTE — TELEPHONE ENCOUNTER
Was the patient seen in the last year in this department? No 2/6/1/    Does patient have an active prescription for medications requested? No     Received Request Via: Pharmacy    pt informed to make a FV

## 2019-10-24 ENCOUNTER — OFFICE VISIT (OUTPATIENT)
Dept: MEDICAL GROUP | Age: 69
End: 2019-10-24
Payer: MEDICARE

## 2019-10-24 VITALS
WEIGHT: 160.4 LBS | DIASTOLIC BLOOD PRESSURE: 58 MMHG | OXYGEN SATURATION: 95 % | SYSTOLIC BLOOD PRESSURE: 110 MMHG | TEMPERATURE: 97.8 F | HEART RATE: 93 BPM | HEIGHT: 67 IN | BODY MASS INDEX: 25.18 KG/M2

## 2019-10-24 DIAGNOSIS — I63.89 CEREBROVASCULAR ACCIDENT (CVA) DUE TO OTHER MECHANISM (HCC): ICD-10-CM

## 2019-10-24 DIAGNOSIS — E78.00 HYPERCHOLESTEREMIA: ICD-10-CM

## 2019-10-24 DIAGNOSIS — I10 ESSENTIAL HYPERTENSION: ICD-10-CM

## 2019-10-24 DIAGNOSIS — N40.0 BENIGN PROSTATIC HYPERPLASIA WITHOUT LOWER URINARY TRACT SYMPTOMS: ICD-10-CM

## 2019-10-24 DIAGNOSIS — E03.1 CONGENITAL HYPOTHYROIDISM WITHOUT GOITER: ICD-10-CM

## 2019-10-24 PROBLEM — I63.9 CVA (CEREBRAL VASCULAR ACCIDENT) (HCC): Status: ACTIVE | Noted: 2019-10-24

## 2019-10-24 PROCEDURE — 99214 OFFICE O/P EST MOD 30 MIN: CPT | Performed by: FAMILY MEDICINE

## 2019-10-24 RX ORDER — SIMVASTATIN 80 MG
80 TABLET ORAL
Qty: 90 TAB | Refills: 1 | Status: SHIPPED | OUTPATIENT
Start: 2019-10-24 | End: 2020-05-12 | Stop reason: SDUPTHER

## 2019-10-24 ASSESSMENT — PATIENT HEALTH QUESTIONNAIRE - PHQ9: CLINICAL INTERPRETATION OF PHQ2 SCORE: 0

## 2019-10-24 NOTE — PROGRESS NOTES
This medical record contains text that has been entered with the assistance of computer voice recognition and dictation software.  Therefore, it may contain unintended errors in text, spelling, punctuation, or grammar    Chief Complaint   Patient presents with   • Medication Management     check medications and refills    • Labs Only     needs blood work          Yovany Estes is a 68 y.o. male here evaluation and management of: medications       HPI:     1. Essential hypertension  He takes hydrochlorothiazide 25 mg and ASA 81 mg for his hypertension without any side effects. Blood pressure in the office today is within goal at 110/58. The patient has been tolerating the BP meds without any issues. The patient denies any side effects.  He denies any headaches, numbness or tingling, no presyncopal syncopal episode, no change in vision, no shortness of breath.    2. Hypercholesteremia  He has been taking Simvastatin 40 mg as prescribed for his dyslipidemia without myalgias or other side effects. Blood work was done before this visit. The patient has been on a statin for years and tolerating this fine. The patient denies any muscle aches, no abdominal pain and no history of elevated liver enzymes.    3. Congenital hypothyroidism without goiter    Levothyroxine 100 mg p.o. Daily    Patient continues to take thyroid replacement medication as prescribed. Blood work was completed prior to this visit.     4. Benign prostatic hyperplasia without lower urinary tract symptoms   The patient denies hesistancy, no dysuria, is able to maintain urinary stream, no spliting or spraying of urinary stream, no terminal dribbling.      5. Cerebrovascular accident (CVA) due to other mechanism (HCC)  NEW PROBLEM    Patient was noted to have a visual field defect. His ophthalmologist ordered an MRI of his brain which showed a small left occipital lobe infarction and chronic microvascular disease without acute ischemia.  Overall he  denies any weakness in any extremity, no numbness or tingling, he has not noticed vision changes although there was a visual defect found on examination.    Current medicines (including changes today)  Current Outpatient Medications   Medication Sig Dispense Refill   • simvastatin (ZOCOR) 80 MG tablet Take 1 Tab by mouth every bedtime. 90 Tab 1   • aspirin (ASA) 81 MG Chew Tab chewable tablet CHEW ONE TABLET BY MOUTH DAILY 90 Tab 0   • levothyroxine (SYNTHROID) 100 MCG Tab TAKE ONE TABLET BY MOUTH EVERY MORNING ON AN EMPTY STOMACH 90 Tab 1   • hydroCHLOROthiazide (HYDRODIURIL) 25 MG Tab TAKE ONE TABLET BY MOUTH DAILY 90 Tab 1   • indomethacin (INDOCIN) 50 MG Cap Take 1 Cap by mouth 3 times a day as needed. 90 Cap 2   • sildenafil citrate (VIAGRA) 100 MG tablet Take 0.5 Tabs by mouth as needed for Erectile Dysfunction. 30 Tab 3     No current facility-administered medications for this visit.      He  has a past medical history of Thyroid disease.  He  has a past surgical history that includes hernia repair.  Social History     Tobacco Use   • Smoking status: Never Smoker   • Smokeless tobacco: Never Used   Substance Use Topics   • Alcohol use: Yes     Alcohol/week: 0.0 oz     Comment: 1 glass a wine 5 days a week.   • Drug use: No     Social History     Social History Narrative   • Not on file     Family History   Problem Relation Age of Onset   • Heart Attack Mother    • Cancer Father         brain tumor   • Stroke Maternal Grandmother 75   • Heart Disease Paternal Grandmother    • Heart Attack Paternal Grandfather      Family Status   Relation Name Status   • Mo     • Fa     • Bro  Alive   • MGMo     • MGFa     • PGMo     • PGFa           ROS    Please see hpi     All other systems reviewed and are negative     Objective:     /58 (BP Location: Left arm, Patient Position: Sitting, BP Cuff Size: Adult)   Pulse 93   Temp 36.6 °C (97.8 °F) (Temporal)   Ht 1.702 m  "(5' 7\")   Wt 72.8 kg (160 lb 6.4 oz)   SpO2 95%  Body mass index is 25.12 kg/m².  Physical Exam:    Constitutional: Alert, no distress.  Skin: Warm, dry, good turgor, no rashes in visible areas.  Eye: Equal, round and reactive, conjunctiva clear, lids normal.  ENMT: Lips without lesions, good dentition, oropharynx clear.  Neck: Trachea midline, no masses, no thyromegaly. No cervical or supraclavicular lymphadenopathy.  Respiratory: Unlabored respiratory effort, lungs clear to auscultation, no wheezes, no ronchi.  Cardiovascular: Normal S1, S2, no murmur, no edema.  Psych: Alert and oriented x3, normal affect and mood.    Neuro:  CN II-XII checked and intact, DTRs 2+ throughout, intact sensation to light touch, vibration, normal gait, No focal deficits, Romberg Normal, Normal tandem gait, normal heel to shin, normal finger to nose.        Assessment and Plan:   The following treatment plan was discussed      1. Essential hypertension  Blood pressure is stable and within goal on current medications. We will continue the current dosages. I have advised him to avoid salty foods and exercise regularly.      2. Hypercholesteremia  Because of the patients recent MRI showing a small TIA we will double his statin dosage. I have advised the patient to increase his exercise regimen and avoid fatty foods. Labs have been ordered for the next follow up visit.     - Comp Metabolic Panel; Future  - CBC WITHOUT DIFFERENTIAL; Future  - Lipid Profile; Future  - simvastatin (ZOCOR) 80 MG tablet; Take 1 Tab by mouth every bedtime.  Dispense: 90 Tab; Refill: 1    3. Congenital hypothyroidism without goiter  Stable on thyroid replacement medications.We will continue the current plan of care. Labs have been ordered for the next follow up visit.    - TSH WITH REFLEX TO FT4; Future    4. Benign prostatic hyperplasia without lower urinary tract symptoms  Chronic and stable. We will recheck with future lab tests.     - PROSTATE SPECIFIC AG " DIAGNOSTIC; Future    5. Cerebrovascular accident (CVA) due to other mechanism (HCC)    We will maximize statin therapy to high intensity,  Continue aspirin  Continue cardiovascular risk reduction  Increase cardiovascular exercise daily  As well I advised him to continue taking ASA daily and exercising regularly.       HEALTH MAINTENANCE: Patient is due for a Hepatitis C screening, Shingles vaccine, and AWV. The patient refused his Influenza vaccine today.    Instructed to Follow up in clinic or ER for worsening symptoms, difficulty breathing, lack of expected recovery, or should new symptoms or problems arise.    Followup: Return in about 6 months (around 4/24/2020) for Reevaluation.      Once again this medical record contains text that has been entered with the assistance of computer voice recognition, dictation software, and medical scribes.  Therefore, it may contain unintended errors in text, spelling, punctuation, or grammar.    Janis RICHARDS (Scribe), am scribing for, and in the presence of, Harry Morel M.D.    Electronically signed by: Janis Morgan (Calebibhusam), 10/24/2019     IHarry M.D. personally performed the services described in this documentation, as scribed by Janis Morgan in my presence, and it is both accurate and complete.

## 2019-10-26 ENCOUNTER — HOSPITAL ENCOUNTER (OUTPATIENT)
Dept: LAB | Facility: MEDICAL CENTER | Age: 69
End: 2019-10-26
Attending: FAMILY MEDICINE
Payer: MEDICARE

## 2019-10-26 DIAGNOSIS — E78.00 HYPERCHOLESTEREMIA: ICD-10-CM

## 2019-10-26 DIAGNOSIS — E03.1 CONGENITAL HYPOTHYROIDISM WITHOUT GOITER: ICD-10-CM

## 2019-10-26 DIAGNOSIS — N40.0 BENIGN PROSTATIC HYPERPLASIA WITHOUT LOWER URINARY TRACT SYMPTOMS: ICD-10-CM

## 2019-10-26 LAB
ALBUMIN SERPL BCP-MCNC: 4.2 G/DL (ref 3.2–4.9)
ALBUMIN/GLOB SERPL: 1.4 G/DL
ALP SERPL-CCNC: 52 U/L (ref 30–99)
ALT SERPL-CCNC: 21 U/L (ref 2–50)
ANION GAP SERPL CALC-SCNC: 9 MMOL/L (ref 0–11.9)
AST SERPL-CCNC: 23 U/L (ref 12–45)
BILIRUB SERPL-MCNC: 0.5 MG/DL (ref 0.1–1.5)
BUN SERPL-MCNC: 16 MG/DL (ref 8–22)
CALCIUM SERPL-MCNC: 9.8 MG/DL (ref 8.5–10.5)
CHLORIDE SERPL-SCNC: 102 MMOL/L (ref 96–112)
CHOLEST SERPL-MCNC: 190 MG/DL (ref 100–199)
CO2 SERPL-SCNC: 29 MMOL/L (ref 20–33)
CREAT SERPL-MCNC: 0.97 MG/DL (ref 0.5–1.4)
ERYTHROCYTE [DISTWIDTH] IN BLOOD BY AUTOMATED COUNT: 44.5 FL (ref 35.9–50)
GLOBULIN SER CALC-MCNC: 2.9 G/DL (ref 1.9–3.5)
GLUCOSE SERPL-MCNC: 101 MG/DL (ref 65–99)
HCT VFR BLD AUTO: 46.5 % (ref 42–52)
HDLC SERPL-MCNC: 68 MG/DL
HGB BLD-MCNC: 15.6 G/DL (ref 14–18)
LDLC SERPL CALC-MCNC: 105 MG/DL
MCH RBC QN AUTO: 31.7 PG (ref 27–33)
MCHC RBC AUTO-ENTMCNC: 33.5 G/DL (ref 33.7–35.3)
MCV RBC AUTO: 94.5 FL (ref 81.4–97.8)
PLATELET # BLD AUTO: 254 K/UL (ref 164–446)
PMV BLD AUTO: 9.7 FL (ref 9–12.9)
POTASSIUM SERPL-SCNC: 4.5 MMOL/L (ref 3.6–5.5)
PROT SERPL-MCNC: 7.1 G/DL (ref 6–8.2)
PSA SERPL-MCNC: 5 NG/ML (ref 0–4)
RBC # BLD AUTO: 4.92 M/UL (ref 4.7–6.1)
SODIUM SERPL-SCNC: 140 MMOL/L (ref 135–145)
T4 FREE SERPL-MCNC: 0.77 NG/DL (ref 0.53–1.43)
TRIGL SERPL-MCNC: 83 MG/DL (ref 0–149)
TSH SERPL DL<=0.005 MIU/L-ACNC: 6.75 UIU/ML (ref 0.38–5.33)
WBC # BLD AUTO: 5.7 K/UL (ref 4.8–10.8)

## 2019-10-26 PROCEDURE — 80053 COMPREHEN METABOLIC PANEL: CPT

## 2019-10-26 PROCEDURE — 36415 COLL VENOUS BLD VENIPUNCTURE: CPT

## 2019-10-26 PROCEDURE — 84153 ASSAY OF PSA TOTAL: CPT

## 2019-10-26 PROCEDURE — 80061 LIPID PANEL: CPT

## 2019-10-26 PROCEDURE — 84443 ASSAY THYROID STIM HORMONE: CPT

## 2019-10-26 PROCEDURE — 84439 ASSAY OF FREE THYROXINE: CPT

## 2019-10-26 PROCEDURE — 85027 COMPLETE CBC AUTOMATED: CPT

## 2019-11-11 DIAGNOSIS — I10 ESSENTIAL HYPERTENSION: ICD-10-CM

## 2019-11-11 RX ORDER — HYDROCHLOROTHIAZIDE 25 MG/1
TABLET ORAL
Qty: 90 TAB | Refills: 0 | Status: SHIPPED | OUTPATIENT
Start: 2019-11-11 | End: 2020-02-11 | Stop reason: SDUPTHER

## 2020-01-24 ENCOUNTER — OFFICE VISIT (OUTPATIENT)
Dept: MEDICAL GROUP | Age: 70
End: 2020-01-24
Payer: MEDICARE

## 2020-01-24 VITALS
BODY MASS INDEX: 25.63 KG/M2 | WEIGHT: 163.3 LBS | SYSTOLIC BLOOD PRESSURE: 128 MMHG | HEART RATE: 79 BPM | OXYGEN SATURATION: 94 % | TEMPERATURE: 98.4 F | HEIGHT: 67 IN | DIASTOLIC BLOOD PRESSURE: 62 MMHG

## 2020-01-24 DIAGNOSIS — E03.9 HYPOTHYROIDISM, UNSPECIFIED TYPE: ICD-10-CM

## 2020-01-24 DIAGNOSIS — H65.00 ACUTE SEROUS OTITIS MEDIA, RECURRENCE NOT SPECIFIED, UNSPECIFIED LATERALITY: ICD-10-CM

## 2020-01-24 DIAGNOSIS — H92.03 DISCOMFORT OF BOTH EARS: ICD-10-CM

## 2020-01-24 DIAGNOSIS — Z11.59 NEED FOR HEPATITIS C SCREENING TEST: Primary | ICD-10-CM

## 2020-01-24 DIAGNOSIS — Z00.00 PREVENTATIVE HEALTH CARE: ICD-10-CM

## 2020-01-24 PROCEDURE — 99214 OFFICE O/P EST MOD 30 MIN: CPT | Performed by: FAMILY MEDICINE

## 2020-01-24 RX ORDER — AMOXICILLIN 875 MG/1
875 TABLET, COATED ORAL 2 TIMES DAILY
Qty: 14 TAB | Refills: 0 | Status: SHIPPED | OUTPATIENT
Start: 2020-01-24 | End: 2021-05-21

## 2020-01-24 RX ORDER — PREDNISONE 20 MG/1
TABLET ORAL
Qty: 12 TAB | Refills: 0 | Status: SHIPPED | OUTPATIENT
Start: 2020-01-24 | End: 2021-05-21

## 2020-01-24 RX ORDER — ASPIRIN 81 MG/1
TABLET, CHEWABLE ORAL
Qty: 90 TAB | Refills: 3 | Status: SHIPPED | OUTPATIENT
Start: 2020-01-24 | End: 2021-02-04

## 2020-01-24 RX ORDER — LEVOTHYROXINE SODIUM 0.1 MG/1
TABLET ORAL
Qty: 90 TAB | Refills: 1 | Status: SHIPPED | OUTPATIENT
Start: 2020-01-24 | End: 2020-07-21 | Stop reason: SDUPTHER

## 2020-01-24 ASSESSMENT — PATIENT HEALTH QUESTIONNAIRE - PHQ9: CLINICAL INTERPRETATION OF PHQ2 SCORE: 0

## 2020-01-24 NOTE — PROGRESS NOTES
This medical record contains text that has been entered with the assistance of computer voice recognition and dictation software. Therefore, it may contain unintended errors in text, spelling, punctuation or grammar.    Chief Complaint   Patient presents with   • Cerumen Impaction     fluid or wax build up in left ear       HPI:   Yovany Fofana is a 69 y.o. male here evaluation and management of:     Acute serous otitis media, recurrence not specified, unspecified laterality   Discomfort of both ears  Acute complaint.    The patient presents today for evaluation of acute ear fullness bilaterally. He has experienced upper respiratory symptoms for the past week including nasal congestion, rhinorrhea and a cough. He has traveled via airplane recently and reports no significant ear pain with the change in altitude. No alleviating factors were reported. Negative for dizziness, loss of balance, shortness of breath or chest pain.     Hypothyroidism, unspecified type  Chronic history.    Patient has a history of hypothyroidism and is taking Levothyroxine 100 mcg once daily every morning on an empty stomach. He denies any medication side effects or acute complaints including palpitations, skin changes, temperature intolerance or changes in bowel habits.    I reviewed and discussed the following labs with the patient.  Results for TIP FOFANA (MRN [id number]) as of 1/24/2020 14:58   Ref. Range 10/26/2019 08:25   TSH Latest Ref Range: 0.380 - 5.330 uIU/mL 6.750 (H)   Free T-4 Latest Ref Range: 0.53 - 1.43 ng/dL 0.77       Preventative health care    Patient is requesting a prescription refill for Aspirin.    Need for hepatitis C screening test    The patient is due for hepatitis C screening. No prior history or known exposure.     Current medicines (including changes today)  Current Outpatient Medications   Medication Sig Dispense Refill   • aspirin (ASA) 81 MG Chew Tab chewable tablet CHEW ONE TABLET BY MOUTH DAILY 90  "Tab 3   • levothyroxine (SYNTHROID) 100 MCG Tab TAKE ONE TABLET BY MOUTH EVERY MORNING ON AN EMPTY STOMACH 90 Tab 1   • amoxicillin (AMOXIL) 875 MG tablet Take 1 Tab by mouth 2 times a day. 14 Tab 0   • predniSONE (DELTASONE) 20 MG Tab Take 3 tabs po for 2 days then 2 tabs for 2 days then 1 for 2 days 12 Tab 0   • hydroCHLOROthiazide (HYDRODIURIL) 25 MG Tab TAKE ONE TABLET BY MOUTH DAILY 90 Tab 0   • simvastatin (ZOCOR) 80 MG tablet Take 1 Tab by mouth every bedtime. 90 Tab 1   • indomethacin (INDOCIN) 50 MG Cap Take 1 Cap by mouth 3 times a day as needed. 90 Cap 2   • sildenafil citrate (VIAGRA) 100 MG tablet Take 0.5 Tabs by mouth as needed for Erectile Dysfunction. 30 Tab 3     No current facility-administered medications for this visit.      He  has a past medical history of Thyroid disease.  He  has a past surgical history that includes hernia repair.  Social History     Tobacco Use   • Smoking status: Never Smoker   • Smokeless tobacco: Never Used   Substance Use Topics   • Alcohol use: Yes     Alcohol/week: 0.0 oz     Comment: 1 glass a wine 5 days a week.   • Drug use: No     Social History     Patient does not qualify to have social determinant information on file (likely too young).   Social History Narrative   • Not on file     Family History   Problem Relation Age of Onset   • Heart Attack Mother    • Cancer Father         brain tumor   • Stroke Maternal Grandmother 75   • Heart Disease Paternal Grandmother    • Heart Attack Paternal Grandfather      Family Status   Relation Name Status   • Mo     • Fa     • Bro  Alive   • MGMo     • MGFa     • PGMo     • PGFa         ROS    Please see HPI for further details.     All other systems reviewed and are negative.     Objective:     /62 (BP Location: Left arm, Patient Position: Sitting, BP Cuff Size: Adult)   Pulse 79   Temp 36.9 °C (98.4 °F) (Temporal)   Ht 1.702 m (5' 7\")   Wt 74.1 kg (163 lb 4.8 oz)  "  SpO2 94%  Body mass index is 25.58 kg/m².    Physical Exam:    Constitutional: Alert, no distress.  Skin: Warm, dry, good turgor, no rashes in visible areas.  Eye: Equal, round and reactive, conjunctiva clear, lids normal.  ENMT: Lips without lesions, good dentition, oropharynx clear. Erythematous left TM with loss of landmarks. Dried blood noted.  Neck: Trachea midline, no masses, no thyromegaly. No cervical or supraclavicular lymphadenopathy.  Respiratory: Unlabored respiratory effort, lungs clear to auscultation, no wheezes, no ronchi.  Cardiovascular: Normal S1, S2, no murmur, no edema.  Psych: Alert and oriented x3, normal affect and mood.      Assessment and Plan:   The following treatment plan was discussed:    1. Acute serous otitis media, recurrence not specified, unspecified laterality  2. Discomfort of both ears    Acute complaint of ear fullness. On exam, the patient has evidence of otitis media and will be treated with a course of Amoxicillin and Prednisone. Return precautions were provided.     - amoxicillin (AMOXIL) 875 MG tablet; Take 1 Tab by mouth 2 times a day.  Dispense: 14 Tab; Refill: 0  - predniSONE (DELTASONE) 20 MG Tab; Take 3 tabs po for 2 days then 2 tabs for 2 days then 1 for 2 days  Dispense: 12 Tab; Refill: 0    3. Hypothyroidism, unspecified type    Chronic, stable history. Well controlled with current medication of Levothyroxine 100 mcg once daily taken on an empty stomach. Patient understands he must wait at least 30 minutes prior to eating or drinking coffee after taking the medication.    - levothyroxine (SYNTHROID) 100 MCG Tab; TAKE ONE TABLET BY MOUTH EVERY MORNING ON AN EMPTY STOMACH  Dispense: 90 Tab; Refill: 1    4. Preventative health care    Prescription refill for Aspirin was provided.    He has a h/o TIA and he has tolerated aspirin so we will continue    - aspirin (ASA) 81 MG Chew Tab chewable tablet; CHEW ONE TABLET BY MOUTH DAILY  Dispense: 90 Tab; Refill: 3    5.  Need for hepatitis C screening test    The patient was born between  and . He is due for hepatitis C screening. The USPSTF recommends offering 1-time screening for HCV infection to adults born between [birth year] and  (baby boomers).    - HCV Scrn ( [id number] 1xLife); Future       HEALTH MAINTENANCE: As noted above.    Instructed to follow up in clinic or ER for worsening symptoms, difficulty breathing, lack of expected recovery or should new symptoms or problems arise.    Follow up: Return in about 3 months (around 2020) for Reevaluation, labs.      Once again this medical record contains text that has been entered with the assistance of computer voice recognition, dictation software and medical scribes. Therefore, it may contain unintended errors in text, spelling, punctuation or grammar.    Tata RICHARDS (Scribe), am scribing for, and in the presence of, Harry Morel M.D.    Electronically signed by: Tata Smart (Scribe), 2020     Harry RICHARDS M.D. personally performed the services described in this documentation, as scribed by Tata Smart in my presence, and it is both accurate and complete.

## 2020-02-10 ENCOUNTER — HOSPITAL ENCOUNTER (OUTPATIENT)
Dept: LAB | Facility: MEDICAL CENTER | Age: 70
End: 2020-02-10
Attending: FAMILY MEDICINE
Payer: MEDICARE

## 2020-02-10 DIAGNOSIS — Z11.59 NEED FOR HEPATITIS C SCREENING TEST: ICD-10-CM

## 2020-02-10 LAB — HCV AB S/CO SERPL IA: NEGATIVE

## 2020-02-10 PROCEDURE — 36415 COLL VENOUS BLD VENIPUNCTURE: CPT | Mod: GY

## 2020-02-10 PROCEDURE — G0472 HEP C SCREEN HIGH RISK/OTHER: HCPCS | Mod: GY

## 2020-02-11 DIAGNOSIS — I10 ESSENTIAL HYPERTENSION: ICD-10-CM

## 2020-02-12 RX ORDER — HYDROCHLOROTHIAZIDE 25 MG/1
TABLET ORAL
Qty: 90 TAB | Refills: 0 | Status: SHIPPED | OUTPATIENT
Start: 2020-02-12 | End: 2020-05-12 | Stop reason: SDUPTHER

## 2020-04-28 ENCOUNTER — HOSPITAL ENCOUNTER (OUTPATIENT)
Dept: LAB | Facility: MEDICAL CENTER | Age: 70
End: 2020-04-28
Attending: PHYSICIAN ASSISTANT
Payer: MEDICARE

## 2020-04-28 PROCEDURE — 36415 COLL VENOUS BLD VENIPUNCTURE: CPT

## 2020-04-28 PROCEDURE — 84153 ASSAY OF PSA TOTAL: CPT

## 2020-04-28 PROCEDURE — 84154 ASSAY OF PSA FREE: CPT

## 2020-04-30 LAB
PSA FREE MFR SERPL: 15 %
PSA FREE SERPL-MCNC: 0.7 NG/ML
PSA SERPL-MCNC: 4.6 NG/ML (ref 0–4)

## 2020-05-12 DIAGNOSIS — I10 ESSENTIAL HYPERTENSION: ICD-10-CM

## 2020-05-12 DIAGNOSIS — E78.00 HYPERCHOLESTEREMIA: ICD-10-CM

## 2020-05-12 RX ORDER — HYDROCHLOROTHIAZIDE 25 MG/1
TABLET ORAL
Qty: 90 TAB | Refills: 0 | Status: SHIPPED | OUTPATIENT
Start: 2020-05-12 | End: 2020-08-14 | Stop reason: SDUPTHER

## 2020-05-12 RX ORDER — SIMVASTATIN 80 MG
80 TABLET ORAL
Qty: 90 TAB | Refills: 0 | Status: SHIPPED | OUTPATIENT
Start: 2020-05-12 | End: 2020-08-21 | Stop reason: SDUPTHER

## 2020-07-21 DIAGNOSIS — E03.9 HYPOTHYROIDISM, UNSPECIFIED TYPE: ICD-10-CM

## 2020-07-22 RX ORDER — LEVOTHYROXINE SODIUM 0.1 MG/1
TABLET ORAL
Qty: 90 TAB | Refills: 1 | Status: SHIPPED | OUTPATIENT
Start: 2020-07-22 | End: 2021-01-25

## 2020-08-14 DIAGNOSIS — I10 ESSENTIAL HYPERTENSION: ICD-10-CM

## 2020-08-14 RX ORDER — HYDROCHLOROTHIAZIDE 25 MG/1
TABLET ORAL
Qty: 90 TAB | Refills: 0 | Status: SHIPPED | OUTPATIENT
Start: 2020-08-14 | End: 2020-11-16 | Stop reason: SDUPTHER

## 2020-08-21 DIAGNOSIS — E78.00 HYPERCHOLESTEREMIA: ICD-10-CM

## 2020-08-21 RX ORDER — SIMVASTATIN 80 MG
80 TABLET ORAL
Qty: 90 TAB | Refills: 0 | Status: SHIPPED | OUTPATIENT
Start: 2020-08-21 | End: 2020-11-25

## 2020-11-10 ENCOUNTER — HOSPITAL ENCOUNTER (OUTPATIENT)
Dept: LAB | Facility: MEDICAL CENTER | Age: 70
End: 2020-11-10
Attending: PHYSICIAN ASSISTANT
Payer: MEDICARE

## 2020-11-10 PROCEDURE — 84154 ASSAY OF PSA FREE: CPT

## 2020-11-10 PROCEDURE — 84153 ASSAY OF PSA TOTAL: CPT

## 2020-11-10 PROCEDURE — 36415 COLL VENOUS BLD VENIPUNCTURE: CPT

## 2020-11-11 LAB
PSA FREE MFR SERPL: 14 %
PSA FREE SERPL-MCNC: 0.6 NG/ML
PSA SERPL-MCNC: 4.4 NG/ML (ref 0–4)

## 2020-11-16 DIAGNOSIS — I10 ESSENTIAL HYPERTENSION: ICD-10-CM

## 2020-11-17 RX ORDER — HYDROCHLOROTHIAZIDE 25 MG/1
TABLET ORAL
Qty: 90 TAB | Refills: 0 | Status: SHIPPED | OUTPATIENT
Start: 2020-11-17 | End: 2021-02-22

## 2020-11-25 DIAGNOSIS — E78.00 HYPERCHOLESTEREMIA: ICD-10-CM

## 2020-11-25 RX ORDER — SIMVASTATIN 80 MG
TABLET ORAL
Qty: 90 TAB | Refills: 0 | Status: SHIPPED | OUTPATIENT
Start: 2020-11-25 | End: 2021-02-22

## 2021-01-16 DIAGNOSIS — Z23 NEED FOR VACCINATION: ICD-10-CM

## 2021-01-25 DIAGNOSIS — E03.9 HYPOTHYROIDISM, UNSPECIFIED TYPE: ICD-10-CM

## 2021-01-26 RX ORDER — LEVOTHYROXINE SODIUM 0.1 MG/1
TABLET ORAL
Qty: 90 TAB | Refills: 0 | Status: SHIPPED | OUTPATIENT
Start: 2021-01-26 | End: 2021-05-03 | Stop reason: SDUPTHER

## 2021-02-03 DIAGNOSIS — Z00.00 PREVENTATIVE HEALTH CARE: ICD-10-CM

## 2021-02-04 RX ORDER — ASPIRIN 81 MG/1
TABLET, CHEWABLE ORAL
Qty: 30 TAB | Refills: 2 | Status: SHIPPED | OUTPATIENT
Start: 2021-02-04 | End: 2021-06-10

## 2021-02-19 ENCOUNTER — IMMUNIZATION (OUTPATIENT)
Dept: FAMILY PLANNING/WOMEN'S HEALTH CLINIC | Facility: IMMUNIZATION CENTER | Age: 71
End: 2021-02-19
Attending: INTERNAL MEDICINE
Payer: MEDICARE

## 2021-02-19 DIAGNOSIS — Z23 ENCOUNTER FOR VACCINATION: Primary | ICD-10-CM

## 2021-02-19 PROCEDURE — 91300 PFIZER SARS-COV-2 VACCINE: CPT

## 2021-02-19 PROCEDURE — 0002A PFIZER SARS-COV-2 VACCINE: CPT

## 2021-02-22 DIAGNOSIS — E78.00 HYPERCHOLESTEREMIA: ICD-10-CM

## 2021-02-22 DIAGNOSIS — I10 ESSENTIAL HYPERTENSION: ICD-10-CM

## 2021-02-23 RX ORDER — SIMVASTATIN 80 MG
TABLET ORAL
Qty: 90 TABLET | Refills: 0 | Status: SHIPPED | OUTPATIENT
Start: 2021-02-23 | End: 2021-06-10

## 2021-02-23 RX ORDER — HYDROCHLOROTHIAZIDE 25 MG/1
TABLET ORAL
Qty: 90 TABLET | Refills: 0 | Status: SHIPPED | OUTPATIENT
Start: 2021-02-23 | End: 2021-06-10

## 2021-03-11 ENCOUNTER — TELEPHONE (OUTPATIENT)
Dept: MEDICAL GROUP | Age: 71
End: 2021-03-11

## 2021-03-11 DIAGNOSIS — Z23 NEED FOR VACCINATION: ICD-10-CM

## 2021-03-11 NOTE — TELEPHONE ENCOUNTER
Patient is on the MA Schedule 03/17/2021 for Shingles vaccine/injection.    SPECIFIC Action To Be Taken: Orders pending, please sign.   yes

## 2021-03-17 ENCOUNTER — NON-PROVIDER VISIT (OUTPATIENT)
Dept: MEDICAL GROUP | Age: 71
End: 2021-03-17
Payer: MEDICARE

## 2021-03-17 DIAGNOSIS — Z23 NEED FOR VACCINATION: ICD-10-CM

## 2021-03-17 PROCEDURE — 90471 IMMUNIZATION ADMIN: CPT | Performed by: FAMILY MEDICINE

## 2021-03-17 PROCEDURE — 90750 HZV VACC RECOMBINANT IM: CPT | Performed by: FAMILY MEDICINE

## 2021-03-17 NOTE — PROGRESS NOTES
"Dandre Estes is a 70 y.o. male here for a non-provider visit for:   SHINGRIX (Shingles)    Reason for immunization: Overdue/Provider Recommended  Immunization records indicate need for vaccine: Yes, confirmed with Epic  Minimum interval has been met for this vaccine: Yes  ABN completed: Not Indicated    Order and dose verified by: AS  VIS Dated   was given to patient: Yes  All IAC Questionnaire questions were answered \"No.\"    Patient tolerated injection and no adverse effects were observed or reported: Yes    Pt scheduled for next dose in series: No  "

## 2021-05-03 DIAGNOSIS — Z00.00 PREVENTATIVE HEALTH CARE: ICD-10-CM

## 2021-05-03 DIAGNOSIS — E03.9 HYPOTHYROIDISM, UNSPECIFIED TYPE: ICD-10-CM

## 2021-05-03 RX ORDER — ASPIRIN 81 MG/1
TABLET, CHEWABLE ORAL
Refills: 1 | OUTPATIENT
Start: 2021-05-03

## 2021-05-03 RX ORDER — LEVOTHYROXINE SODIUM 0.1 MG/1
100 TABLET ORAL
Qty: 30 TABLET | Refills: 0 | Status: SHIPPED
Start: 2021-05-03 | End: 2021-06-09

## 2021-05-17 ENCOUNTER — PATIENT MESSAGE (OUTPATIENT)
Dept: HEALTH INFORMATION MANAGEMENT | Facility: OTHER | Age: 71
End: 2021-05-17

## 2021-05-20 ENCOUNTER — TELEPHONE (OUTPATIENT)
Dept: MEDICAL GROUP | Age: 71
End: 2021-05-20

## 2021-05-20 NOTE — TELEPHONE ENCOUNTER
ESTABLISHED PATIENT PRE-VISIT PLANNING     Patient was NOT contacted to complete PVP.     Note: Patient will not be contacted if there is no indication to call.     1.  Reviewed notes from the last few office visits within the medical group: Yes    2.  If any orders were placed at last visit or intended to be done for this visit (i.e. 6 mos follow-up), do we have Results/Consult Notes?         •  Labs - Labs ordered, completed on 2/10/2020 and results are in chart.  Note: If patient appointment is for lab review and patient did not complete labs, check with provider if OK to reschedule patient until labs completed.       •  Imaging - Imaging was not ordered at last office visit.       •  Referrals - No referrals were ordered at last office visit.    3. Is this appointment scheduled as a Hospital Follow-Up? No    4.  Immunizations were updated in Epic using Reconcile Outside Information activity? Yes    5.  Patient is due for the following Health Maintenance Topics:   Health Maintenance Due   Topic Date Due   • Annual Wellness Visit  12/01/2018   • IMM ZOSTER VACCINES (2 of 2) 05/12/2021       - Patient plans to schedule appointment for Annual Wellness Visit (AWV) and Immunizations: SHINGRIX (Shingles).    6.  AHA (Pulse8) form printed for Provider? No, No alerts

## 2021-05-21 ENCOUNTER — TELEMEDICINE (OUTPATIENT)
Dept: MEDICAL GROUP | Age: 71
End: 2021-05-21
Payer: MEDICARE

## 2021-05-21 VITALS — BODY MASS INDEX: 25.11 KG/M2 | WEIGHT: 160 LBS | HEIGHT: 67 IN

## 2021-05-21 DIAGNOSIS — J06.9 VIRAL UPPER RESPIRATORY TRACT INFECTION: ICD-10-CM

## 2021-05-21 DIAGNOSIS — R97.20 ELEVATED PSA: ICD-10-CM

## 2021-05-21 DIAGNOSIS — E78.00 HYPERCHOLESTEREMIA: ICD-10-CM

## 2021-05-21 DIAGNOSIS — E55.9 VITAMIN D DEFICIENCY: ICD-10-CM

## 2021-05-21 DIAGNOSIS — E03.9 HYPOTHYROIDISM, UNSPECIFIED TYPE: ICD-10-CM

## 2021-05-21 PROCEDURE — 99214 OFFICE O/P EST MOD 30 MIN: CPT | Mod: 95 | Performed by: FAMILY MEDICINE

## 2021-05-21 RX ORDER — PREDNISONE 20 MG/1
TABLET ORAL
Qty: 12 TABLET | Refills: 0 | Status: SHIPPED
Start: 2021-05-21 | End: 2021-06-09

## 2021-05-21 RX ORDER — AMOXICILLIN 875 MG/1
875 TABLET, COATED ORAL 2 TIMES DAILY
Qty: 14 TABLET | Refills: 0 | Status: SHIPPED | OUTPATIENT
Start: 2021-05-21 | End: 2022-07-05

## 2021-05-21 ASSESSMENT — PATIENT HEALTH QUESTIONNAIRE - PHQ9: CLINICAL INTERPRETATION OF PHQ2 SCORE: 0

## 2021-05-21 ASSESSMENT — FIBROSIS 4 INDEX: FIB4 SCORE: 1.42

## 2021-05-21 NOTE — PROGRESS NOTES
Virtual Visit: Established Patient   This visit was conducted via Zoom using secure and encrypted videoconferencing technology. The patient was in a private location in the state of [place].    The patient's identity was confirmed and verbal consent was obtained for this virtual visit.    Subjective:   CC:   Chief Complaint   Patient presents with   • Medication Refill     aspirin       Yovany Estes is a 70 y.o. male presenting for evaluation and management of:    1. Viral upper respiratory tract infection  NEW UNDIAGNOSED PROBLEM    The patient is a very pleasant 70-year-old male who presents to clinic with a chief complaint of having nasal sinus congestion stuffy nose frontal sinus congestion cough that is nonproductive.  He also complains of some generalized malaise, he is able to tolerate p.o. fluids his appetite has not decreased, he has been vaccinated for COVID-19.  He states he has tried over-the-counter anticold medications with no improvement.  He denies any fevers chills night sweats no unintentional weight loss.  He denies any chest pain or shortness of breath no back pain.      ROS   Denies any recent fevers or chills. No nausea or vomiting. No chest pains or shortness of breath.     No Known Allergies    Current medicines (including changes today)  Current Outpatient Medications   Medication Sig Dispense Refill   • amoxicillin (AMOXIL) 875 MG tablet Take 1 tablet by mouth 2 times a day. 14 tablet 0   • Ascorbic Acid 500 MG Cap TAKE 2 TABS PO TID 30 capsule 0   • Zinc Acetate, Oral, 50 MG Cap Take 1 capsule by mouth 2 times a day. 14 capsule 0   • predniSONE (DELTASONE) 20 MG Tab Take 3 tabs po for 2 days then 2 tabs for 2 days then 1 for 2 days 12 tablet 0   • levothyroxine (SYNTHROID) 100 MCG Tab Take 1 tablet by mouth every morning on an empty stomach. ON EMPTY STOMACH 30 tablet 0   • simvastatin (ZOCOR) 80 MG tablet TAKE ONE TABLET BY MOUTH EVERY NIGHT AT BEDTIME 90 tablet 0   •  "hydroCHLOROthiazide (HYDRODIURIL) 25 MG Tab TAKE ONE TABLET BY MOUTH DAILY 90 tablet 0   • aspirin (ASA) 81 MG Chew Tab chewable tablet CHEW ONE TABLET BY MOUTH DAILY 30 Tab 2     No current facility-administered medications for this visit.       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   • Slow transit constipation 11/30/2017   • Lateral epicondylitis of left elbow 11/30/2017   • Medicare annual wellness visit, initial 11/30/2017   • Congenital hypothyroidism without goiter 03/01/2017   • 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  He  has a past surgical history that includes hernia repair.       Objective:   Ht 1.702 m (5' 7\") Comment: pt states  Wt 72.6 kg (160 lb) Comment: pt states  BMI 25.06 kg/m²     Physical Exam:  Constitutional: Alert, no distress, well-groomed.  Skin: No rashes in visible areas.  Eye: Round. Conjunctiva clear, lids normal. No icterus.   ENMT: Lips pink without lesions, good dentition, moist mucous membranes. Phonation normal.  Neck: No masses, no " thyromegaly. Moves freely without pain.  Respiratory: Unlabored respiratory effort, no cough or audible wheeze  Psych: Alert and oriented x3, normal affect and mood.       Assessment and Plan:   The following treatment plan was discussed:     1. Viral upper respiratory tract infection  The patient is nontoxic in appearance  Afebrile and  hemodynamically stable  So  may be safely treated as an outpatient.      - amoxicillin (AMOXIL) 875 MG tablet; Take 1 tablet by mouth 2 times a day.  Dispense: 14 tablet; Refill: 0  - Ascorbic Acid 500 MG Cap; TAKE 2 TABS PO TID  Dispense: 30 capsule; Refill: 0  - Zinc Acetate, Oral, 50 MG Cap; Take 1 capsule by mouth 2 times a day.  Dispense: 14 capsule; Refill: 0  - predniSONE (DELTASONE) 20 MG Tab; Take 3 tabs po for 2 days then 2 tabs for 2 days then 1 for 2 days  Dispense: 12 tablet; Refill: 0    Annual labs  - Comp Metabolic Panel; Future  - CBC WITHOUT DIFFERENTIAL; Future  - Lipid Profile; Future    - TSH+FREE T4  - T3 FREE; Future    - PROSTATE SPECIFIC AG DIAGNOSTIC; Future  - VITAMIN D,25 HYDROXY; Future        Follow-up: Return in about 4 weeks (around 6/18/2021) for Reevaluation, labs.

## 2021-06-02 ENCOUNTER — HOSPITAL ENCOUNTER (OUTPATIENT)
Dept: LAB | Facility: MEDICAL CENTER | Age: 71
End: 2021-06-02
Attending: FAMILY MEDICINE
Payer: MEDICARE

## 2021-06-02 DIAGNOSIS — E03.9 HYPOTHYROIDISM, UNSPECIFIED TYPE: ICD-10-CM

## 2021-06-02 DIAGNOSIS — E78.00 HYPERCHOLESTEREMIA: ICD-10-CM

## 2021-06-02 DIAGNOSIS — E55.9 VITAMIN D DEFICIENCY: ICD-10-CM

## 2021-06-02 DIAGNOSIS — R97.20 ELEVATED PSA: ICD-10-CM

## 2021-06-02 LAB
ALBUMIN SERPL BCP-MCNC: 4 G/DL (ref 3.2–4.9)
ALBUMIN/GLOB SERPL: 1.7 G/DL
ALP SERPL-CCNC: 51 U/L (ref 30–99)
ALT SERPL-CCNC: 41 U/L (ref 2–50)
ANION GAP SERPL CALC-SCNC: 9 MMOL/L (ref 7–16)
AST SERPL-CCNC: 24 U/L (ref 12–45)
BILIRUB SERPL-MCNC: 0.5 MG/DL (ref 0.1–1.5)
BUN SERPL-MCNC: 22 MG/DL (ref 8–22)
CALCIUM SERPL-MCNC: 9.3 MG/DL (ref 8.5–10.5)
CHLORIDE SERPL-SCNC: 103 MMOL/L (ref 96–112)
CHOLEST SERPL-MCNC: 188 MG/DL (ref 100–199)
CO2 SERPL-SCNC: 26 MMOL/L (ref 20–33)
CREAT SERPL-MCNC: 0.95 MG/DL (ref 0.5–1.4)
ERYTHROCYTE [DISTWIDTH] IN BLOOD BY AUTOMATED COUNT: 44.6 FL (ref 35.9–50)
FASTING STATUS PATIENT QL REPORTED: NORMAL
GLOBULIN SER CALC-MCNC: 2.4 G/DL (ref 1.9–3.5)
GLUCOSE SERPL-MCNC: 95 MG/DL (ref 65–99)
HCT VFR BLD AUTO: 46.2 % (ref 42–52)
HDLC SERPL-MCNC: 50 MG/DL
HGB BLD-MCNC: 15.4 G/DL (ref 14–18)
LDLC SERPL CALC-MCNC: 108 MG/DL
MCH RBC QN AUTO: 31.1 PG (ref 27–33)
MCHC RBC AUTO-ENTMCNC: 33.3 G/DL (ref 33.7–35.3)
MCV RBC AUTO: 93.3 FL (ref 81.4–97.8)
PLATELET # BLD AUTO: 248 K/UL (ref 164–446)
PMV BLD AUTO: 9.5 FL (ref 9–12.9)
POTASSIUM SERPL-SCNC: 4.3 MMOL/L (ref 3.6–5.5)
PROT SERPL-MCNC: 6.4 G/DL (ref 6–8.2)
PSA SERPL-MCNC: 4.82 NG/ML (ref 0–4)
RBC # BLD AUTO: 4.95 M/UL (ref 4.7–6.1)
SODIUM SERPL-SCNC: 138 MMOL/L (ref 135–145)
T3FREE SERPL-MCNC: 2.15 PG/ML (ref 2–4.4)
T4 FREE SERPL-MCNC: 1.05 NG/DL (ref 0.93–1.7)
TRIGL SERPL-MCNC: 149 MG/DL (ref 0–149)
TSH SERPL DL<=0.005 MIU/L-ACNC: 8.3 UIU/ML (ref 0.38–5.33)
WBC # BLD AUTO: 7.3 K/UL (ref 4.8–10.8)

## 2021-06-02 PROCEDURE — 80053 COMPREHEN METABOLIC PANEL: CPT

## 2021-06-02 PROCEDURE — 84481 FREE ASSAY (FT-3): CPT

## 2021-06-02 PROCEDURE — 84443 ASSAY THYROID STIM HORMONE: CPT

## 2021-06-02 PROCEDURE — 85027 COMPLETE CBC AUTOMATED: CPT

## 2021-06-02 PROCEDURE — 82306 VITAMIN D 25 HYDROXY: CPT

## 2021-06-02 PROCEDURE — 36415 COLL VENOUS BLD VENIPUNCTURE: CPT

## 2021-06-02 PROCEDURE — 84153 ASSAY OF PSA TOTAL: CPT

## 2021-06-02 PROCEDURE — 84439 ASSAY OF FREE THYROXINE: CPT

## 2021-06-02 PROCEDURE — 80061 LIPID PANEL: CPT

## 2021-06-03 LAB — 25(OH)D3 SERPL-MCNC: 22 NG/ML (ref 30–80)

## 2021-06-09 ENCOUNTER — OFFICE VISIT (OUTPATIENT)
Dept: MEDICAL GROUP | Age: 71
End: 2021-06-09
Payer: MEDICARE

## 2021-06-09 VITALS
OXYGEN SATURATION: 95 % | WEIGHT: 163.6 LBS | BODY MASS INDEX: 25.68 KG/M2 | DIASTOLIC BLOOD PRESSURE: 70 MMHG | TEMPERATURE: 98 F | HEIGHT: 67 IN | SYSTOLIC BLOOD PRESSURE: 142 MMHG | HEART RATE: 84 BPM

## 2021-06-09 DIAGNOSIS — I10 ESSENTIAL HYPERTENSION: ICD-10-CM

## 2021-06-09 DIAGNOSIS — E03.9 HYPOTHYROIDISM, UNSPECIFIED TYPE: ICD-10-CM

## 2021-06-09 DIAGNOSIS — R97.20 ELEVATED PSA: ICD-10-CM

## 2021-06-09 PROCEDURE — 99214 OFFICE O/P EST MOD 30 MIN: CPT | Performed by: FAMILY MEDICINE

## 2021-06-09 RX ORDER — LEVOTHYROXINE SODIUM 0.12 MG/1
125 TABLET ORAL
Qty: 90 TABLET | Refills: 0 | Status: SHIPPED | OUTPATIENT
Start: 2021-06-09 | End: 2021-09-10 | Stop reason: SDUPTHER

## 2021-06-09 RX ORDER — ASCORBIC ACID 500 MG
TABLET ORAL
COMMUNITY
Start: 2021-05-25 | End: 2023-10-19

## 2021-06-09 ASSESSMENT — FIBROSIS 4 INDEX: FIB4 SCORE: 1.09

## 2021-06-09 NOTE — PROGRESS NOTES
This medical record contains text that has been entered with the assistance of computer voice recognition and dictation software.  Therefore, it may contain unintended errors in text, spelling, punctuation, or grammar      Chief Complaint   Patient presents with   • Lab Results   • Medication Refill         Yovany Fofana is a 70 y.o. male here evaluation and management of: labs    HPI:     1. Hypothyroidism, unspecified type    The patient states that he has been very tired in the afternoons.    Results for TIP FOFANA (MRN [id number]) as of 6/9/2021 09:50   Ref. Range 6/2/2021 07:33   TSH Latest Ref Range: 0.380 - 5.330 uIU/mL 8.300 (H)   Free T-4 Latest Ref Range: 0.93 - 1.70 ng/dL 1.05   T3,Free Latest Ref Range: 2.00 - 4.40 pg/mL 2.15       2. Essential hypertension  HCTZ 25mg po q24h    Home BP readings--- not monitoring    Today, the patient has been tollerating the BP meds without any issues. The patient denies any weakness, chest pain.        3. Elevated PSA  Followed by Dr. Heaton Q 6months     The patient denies hesistancy, no dysuria, is able to maintain urinary stream, no spliting or spraying of urinary stream, no terminal dribbling.      Results for TIP FOFANA (MRN [id number]) as of 6/9/2021 09:50   Ref. Range 10/26/2019 08:25 2/10/2020 14:51 4/28/2020 10:14 11/10/2020 10:06 6/2/2021 07:33   Prostatic Specific Antigen Tot Latest Ref Range: 0.00 - 4.00 ng/mL 5.00 (H)    4.82 (H)       Current medicines (including changes today)  Current Outpatient Medications   Medication Sig Dispense Refill   • ascorbic acid (VITAMIN C) 500 MG tablet      • levothyroxine (SYNTHROID) 125 MCG Tab Take 1 tablet by mouth every morning on an empty stomach. 90 tablet 0   • amoxicillin (AMOXIL) 875 MG tablet Take 1 tablet by mouth 2 times a day. 14 tablet 0   • simvastatin (ZOCOR) 80 MG tablet TAKE ONE TABLET BY MOUTH EVERY NIGHT AT BEDTIME 90 tablet 0   • hydroCHLOROthiazide (HYDRODIURIL) 25 MG Tab TAKE ONE TABLET BY  "MOUTH DAILY 90 tablet 0   • aspirin (ASA) 81 MG Chew Tab chewable tablet CHEW ONE TABLET BY MOUTH DAILY 30 Tab 2     No current facility-administered medications for this visit.     He  has a past medical history of Thyroid disease.  He  has a past surgical history that includes hernia repair.  Social History     Tobacco Use   • Smoking status: Never Smoker   • Smokeless tobacco: Never Used   Vaping Use   • Vaping Use: Never used   Substance Use Topics   • Alcohol use: Yes     Alcohol/week: 1.2 - 2.4 oz     Types: 1 - 2 Shots of liquor, 1 - 2 Glasses of wine per week     Comment: every day   • Drug use: No     Social History     Social History Narrative   • Not on file     Family History   Problem Relation Age of Onset   • Heart Attack Mother    • Cancer Father         brain tumor   • Stroke Maternal Grandmother 75   • Heart Disease Paternal Grandmother    • Heart Attack Paternal Grandfather      Family Status   Relation Name Status   • Mo     • Fa     • Bro  Alive   • MGMo     • MGFa     • PGMo     • PGFa           ROS    The pertinent  ROS findings can be seen in the HPI above.     All other systems reviewed and are negative     Objective:     /70 (BP Location: Left arm, Patient Position: Sitting, BP Cuff Size: Adult)   Pulse 84   Temp 36.7 °C (98 °F) (Temporal)   Ht 1.702 m (5' 7\")   Wt 74.2 kg (163 lb 9.6 oz)   SpO2 95%  Body mass index is 25.62 kg/m².      Physical Exam:    Constitutional: Alert, no distress.  Skin: No suspicious lesions  Eye: Equal, round and reactive, conjunctiva clear, lids normal.  ENMT: Lips without lesions, good dentition, oropharynx clear.  Neck: Trachea midline, no masses, no thyromegaly. No cervical or supraclavicular lymphadenopathy.  Respiratory: Unlabored respiratory effort, lungs clear to auscultation, no wheezes, no ronchi.  Cardiovascular: Normal S1, S2, no murmur, no edema  Abdomen: Soft, non-tender, no masses, no " hepatosplenomegaly.        Assessment and Plan:   The following treatment plan was discussed      1. Hypothyroidism, unspecified type  Increase synthroid to 125mcg   Repeat labs in 6-8 wks.      - levothyroxine (SYNTHROID) 125 MCG Tab; Take 1 tablet by mouth every morning on an empty stomach.  Dispense: 90 tablet; Refill: 0    2. Essential hypertension  Patient has been stable with current management  We will make no changes for now      3. Elevated PSA  Patient has been stable with current management  We will make no changes for now    - PROSTATE SPECIFIC AG DIAGNOSTIC; Future            Instructed to Follow up in clinic or ER for worsening symptoms, difficulty breathing, lack of expected recovery, or should new symptoms or problems arise.    Followup: Return in about 6 months (around 12/9/2021) for Reevaluation, labs.

## 2021-06-10 DIAGNOSIS — E78.00 HYPERCHOLESTEREMIA: ICD-10-CM

## 2021-06-10 DIAGNOSIS — I10 ESSENTIAL HYPERTENSION: ICD-10-CM

## 2021-06-10 DIAGNOSIS — Z00.00 PREVENTATIVE HEALTH CARE: ICD-10-CM

## 2021-06-11 RX ORDER — ASPIRIN 81 MG/1
TABLET, CHEWABLE ORAL
Qty: 90 TABLET | Refills: 3 | Status: SHIPPED | OUTPATIENT
Start: 2021-06-11 | End: 2022-06-07 | Stop reason: SDUPTHER

## 2021-06-11 RX ORDER — HYDROCHLOROTHIAZIDE 25 MG/1
TABLET ORAL
Qty: 90 TABLET | Refills: 3 | Status: SHIPPED | OUTPATIENT
Start: 2021-06-11 | End: 2022-06-07 | Stop reason: SDUPTHER

## 2021-06-11 RX ORDER — SIMVASTATIN 80 MG
TABLET ORAL
Qty: 90 TABLET | Refills: 3 | Status: SHIPPED | OUTPATIENT
Start: 2021-06-11 | End: 2022-04-15 | Stop reason: SDUPTHER

## 2021-07-08 ENCOUNTER — TELEPHONE (OUTPATIENT)
Dept: MEDICAL GROUP | Age: 71
End: 2021-07-08

## 2021-07-08 DIAGNOSIS — Z23 NEED FOR VACCINATION: ICD-10-CM

## 2021-07-08 NOTE — TELEPHONE ENCOUNTER
Patient is on the MA Schedule 07/14/2021 for Shingles vaccine/injection.    SPECIFIC Action To Be Taken: Orders pending, please sign.

## 2021-07-14 ENCOUNTER — NON-PROVIDER VISIT (OUTPATIENT)
Dept: MEDICAL GROUP | Age: 71
End: 2021-07-14
Payer: MEDICARE

## 2021-07-14 DIAGNOSIS — Z23 NEED FOR VACCINATION: ICD-10-CM

## 2021-07-14 PROCEDURE — 90471 IMMUNIZATION ADMIN: CPT | Performed by: FAMILY MEDICINE

## 2021-07-14 PROCEDURE — 90750 HZV VACC RECOMBINANT IM: CPT | Performed by: FAMILY MEDICINE

## 2021-07-14 NOTE — PROGRESS NOTES
"Dandre Etses is a 70 y.o. male here for a non-provider visit for:   SHINGRIX (Shingles)    Reason for immunization: continue or complete series started at the office  Immunization records indicate need for vaccine: Yes, confirmed with Epic  Minimum interval has been met for this vaccine: Yes  ABN completed: Not Indicated    VIS Dated   was given to patient: Yes  All IAC Questionnaire questions were answered \"No.\"    Patient tolerated injection and no adverse effects were observed or reported: Yes    Pt scheduled for next dose in series: No  "

## 2021-07-16 ENCOUNTER — PATIENT OUTREACH (OUTPATIENT)
Dept: HEALTH INFORMATION MANAGEMENT | Facility: OTHER | Age: 71
End: 2021-07-16

## 2021-07-16 NOTE — NON-PROVIDER
Outcome:    HIPAA verified - Declined [facility] for now. He is up to date with PCP    [facility] Verified: yes    Attempt # 2

## 2021-09-10 DIAGNOSIS — E03.9 HYPOTHYROIDISM, UNSPECIFIED TYPE: ICD-10-CM

## 2021-09-10 RX ORDER — LEVOTHYROXINE SODIUM 0.12 MG/1
125 TABLET ORAL
Qty: 100 TABLET | Refills: 3 | Status: SHIPPED | OUTPATIENT
Start: 2021-09-10 | End: 2022-06-07 | Stop reason: SDUPTHER

## 2021-10-27 ENCOUNTER — HOSPITAL ENCOUNTER (OUTPATIENT)
Dept: LAB | Facility: MEDICAL CENTER | Age: 71
End: 2021-10-27
Attending: PHYSICIAN ASSISTANT
Payer: MEDICARE

## 2021-10-27 PROCEDURE — 36415 COLL VENOUS BLD VENIPUNCTURE: CPT

## 2021-10-27 PROCEDURE — 84153 ASSAY OF PSA TOTAL: CPT

## 2021-10-27 PROCEDURE — 84154 ASSAY OF PSA FREE: CPT

## 2021-10-29 LAB
PSA FREE MFR SERPL: 14 %
PSA FREE SERPL-MCNC: 0.7 NG/ML
PSA SERPL-MCNC: 4.9 NG/ML (ref 0–4)

## 2021-11-24 ENCOUNTER — TELEPHONE (OUTPATIENT)
Dept: HEALTH INFORMATION MANAGEMENT | Facility: OTHER | Age: 71
End: 2021-11-24

## 2021-12-16 NOTE — PROGRESS NOTES
This medical record contains text that has been entered with the assistance of computer voice recognition and dictation software.  Therefore, it may contain unintended errors in text, spelling, punctuation, or grammar    Chief Complaint   Patient presents with   • Biopsy     mole removal       Yovany Estes is a 67 y.o. male here evaluation and management of: NUB      HPI:     Neoplasm of uncertain behavior  We  noticed a  new, growing,hyperpigmented, irregular,  macule on  routine skin exam. Patient states that it may have changed, his wife is very concerned and would like it removed.          Current medicines (including changes today)  Current Outpatient Prescriptions   Medication Sig Dispense Refill   • hydrochlorothiazide (HYDRODIURIL) 25 MG Tab TAKE ONE TABLET BY MOUTH DAILY 90 Tab 0   • aspirin (ASA) 81 MG Chew Tab chewable tablet CHEW AND SWALLOW ONE TABLET BY MOUTH DAILY 360 Tab 0   • levothyroxine (SYNTHROID) 100 MCG Tab TAKE ONE TABLET BY MOUTH EVERY MORNING ON AN EMPTY STOMACH 90 Tab 0   • psyllium (METAMUCIL SMOOTH TEXTURE) 28 % packet Take 1 Packet by mouth 2 times a day. 100 Each 3   • docusate sodium (COLACE) 100 MG Cap Take 1 Cap by mouth 2 times a day. 90 Cap 0   • Misc. Devices Misc Please provide patient with an elbow air cast (any brand ok) 1 Application 1   • simvastatin (ZOCOR) 40 MG Tab TAKE ONE TABLET BY MOUTH EVERY EVENING 90 Tab 1   • indomethacin (INDOCIN) 50 MG Cap Take 1 Cap by mouth 3 times a day. 90 Cap 0   • Misc. Devices Misc Please provide patient with shingles vaccine 1 Application 0   • sildenafil citrate (VIAGRA) 100 MG tablet Take 0.5 Tabs by mouth as needed for Erectile Dysfunction. 30 Tab 3   • Misc. Devices Misc Please provide patient with shingles vaccine 1 Application 0   • Brimonidine Tartrate-Timolol (COMBIGAN) 0.2-0.5 % SOLN by Ophthalmic route.     • timolol (BETIMOL) 0.5 % ophthalmic solution Place 1 Drop in both eyes 2 times a day. use in affected eye(s)    "Indications: Increased Pressure Within the Eye       No current facility-administered medications for this visit.      He  has a past medical history of Thyroid disease.  He  has a past surgical history that includes hernia repair.  Social History   Substance Use Topics   • Smoking status: Never Smoker   • Smokeless tobacco: Never Used   • Alcohol use 0.0 oz/week      Comment: 1 glass a wine 5 days a week.     Social History     Social History Narrative   • No narrative on file     Family History   Problem Relation Age of Onset   • Heart Attack Mother    • Cancer Father      brain tumor   • Stroke Maternal Grandmother 75   • Heart Disease Paternal Grandmother    • Heart Attack Paternal Grandfather      Family Status   Relation Status   • Mother    • Father    • Brother Alive   • Maternal Grandmother    • Maternal Grandfather    • Paternal Grandmother    • Paternal Grandfather          ROS    Please see hpi     All other systems reviewed and are negative     Objective:     Blood pressure 140/84, pulse 84, temperature 37.1 °C (98.8 °F), height 1.702 m (5' 7.01\"), weight 73.8 kg (162 lb 12.8 oz), SpO2 95 %. Body mass index is 25.49 kg/m².  Physical Exam:    Constitutional: Alert, no distress.  Skin: Warm, dry, good turgor, no rashes in visible areas.  Eye: Equal, round and reactive, conjunctiva clear, lids normal.  ENMT: Lips without lesions, good dentition, oropharynx clear.  Neck: Trachea midline, no masses, no thyromegaly. No cervical or supraclavicular lymphadenopathy.  Respiratory: Unlabored respiratory effort, lungs clear to auscultation, no wheezes, no ronchi.  Cardiovascular: Normal S1, S2, no murmur, no edema.  Abdomen: Soft, non-tender, no masses, no hepatosplenomegaly.  Psych: Alert and oriented x3, normal affect and mood.        Excision---8 mm, irregular, assymmetric, hyperpigmented macule located on right mid abdomen    Dermoscopy revealed  Pigmented " networks,dots and globules,, chrystalline structures,     Final Length of Excision--1.2cm        After informed written consent was obtained, using Betadine for cleansing and 1% Lidocaine w- epinephrine for anesthetic, with sterile technique a 6 mm punch tool was used to obtain and excise  the lesion through dermis (full thickness) . Intent was to remove entire lesion with margins . Hemostasis was obtained by pressure and wound was  Sutured  With*Vycryl x4. ( one in intermediate layer closure) Antibiotic dressing is applied, and wound care instructions provided. Be alert for any signs of cutaneous infection. The specimen is labeled and sent to pathology for evaluation. The procedure was well tolerated without complications.    Intermediate layer closure    The needle was inserted in the dermis and directed toward the skin surface, exiting near the dermal-epidermal junction on the same side.   then the needle was inserted on the opposite side of the wound near the dermal-epidermal junction, directly across from the point of exit.  The suture loop was completed in the dermis, directly opposite the origin of the loop, and the knot tied.        Assessment and Plan:   The following treatment plan was discussed        1. Neoplasm of uncertain behavior  Patient tollerrated procedure well  There were no adverse events  Patient was given post procedure precautions     - PATHOLOGY SPECIMEN; Future    2. Essential hypertension  Patient has been stable with current management  We will make no changes for now    - hydrochlorothiazide (HYDRODIURIL) 25 MG Tab; TAKE ONE TABLET BY MOUTH DAILY  Dispense: 90 Tab; Refill: 0    3. Preventative health care    - aspirin (ASA) 81 MG Chew Tab chewable tablet; CHEW AND SWALLOW ONE TABLET BY MOUTH DAILY  Dispense: 360 Tab; Refill: 0            HEALTH MAINTENANCE:    Instructed to Follow up in clinic or ER for worsening symptoms, difficulty breathing, lack of expected recovery, or should new  symptoms or problems arise.    Followup: Return in about 2 weeks (around 1/3/2018) for Suture Removal.       Once again this medical record contains text that has been entered with the assistance of computer voice recognition and dictation software.  Therefore, it may contain unintended errors in text, spelling, punctuation, or grammar          contact guard

## 2022-04-15 DIAGNOSIS — E78.00 HYPERCHOLESTEREMIA: ICD-10-CM

## 2022-04-19 RX ORDER — SIMVASTATIN 80 MG
80 TABLET ORAL
Qty: 90 TABLET | Refills: 3 | Status: SHIPPED | OUTPATIENT
Start: 2022-04-19 | End: 2023-03-08 | Stop reason: SDUPTHER

## 2022-06-07 DIAGNOSIS — E03.9 HYPOTHYROIDISM, UNSPECIFIED TYPE: ICD-10-CM

## 2022-06-07 DIAGNOSIS — Z00.00 PREVENTATIVE HEALTH CARE: ICD-10-CM

## 2022-06-07 DIAGNOSIS — I10 ESSENTIAL HYPERTENSION: ICD-10-CM

## 2022-06-07 NOTE — TELEPHONE ENCOUNTER
Received request via: Pharmacy    Was the patient seen in the last year in this department? Yes 6/9/21    Does the patient have an active prescription (recently filled or refills available) for medication(s) requested? No

## 2022-06-08 RX ORDER — LEVOTHYROXINE SODIUM 0.12 MG/1
125 TABLET ORAL
Qty: 100 TABLET | Refills: 3 | Status: SHIPPED | OUTPATIENT
Start: 2022-06-08 | End: 2023-07-31 | Stop reason: SDUPTHER

## 2022-06-08 RX ORDER — ASPIRIN 81 MG/1
TABLET, CHEWABLE ORAL
Qty: 90 TABLET | Refills: 3 | Status: SHIPPED | OUTPATIENT
Start: 2022-06-08 | End: 2023-06-26 | Stop reason: SDUPTHER

## 2022-06-08 RX ORDER — HYDROCHLOROTHIAZIDE 25 MG/1
25 TABLET ORAL DAILY
Qty: 90 TABLET | Refills: 3 | Status: SHIPPED | OUTPATIENT
Start: 2022-06-08 | End: 2023-06-22 | Stop reason: SDUPTHER

## 2022-06-09 ENCOUNTER — PATIENT MESSAGE (OUTPATIENT)
Dept: HEALTH INFORMATION MANAGEMENT | Facility: OTHER | Age: 72
End: 2022-06-09

## 2022-07-04 SDOH — ECONOMIC STABILITY: FOOD INSECURITY: WITHIN THE PAST 12 MONTHS, THE FOOD YOU BOUGHT JUST DIDN'T LAST AND YOU DIDN'T HAVE MONEY TO GET MORE.: NEVER TRUE

## 2022-07-04 SDOH — ECONOMIC STABILITY: HOUSING INSECURITY: IN THE LAST 12 MONTHS, HOW MANY PLACES HAVE YOU LIVED?: 1

## 2022-07-04 SDOH — HEALTH STABILITY: PHYSICAL HEALTH: ON AVERAGE, HOW MANY MINUTES DO YOU ENGAGE IN EXERCISE AT THIS LEVEL?: 20 MIN

## 2022-07-04 SDOH — ECONOMIC STABILITY: FOOD INSECURITY: WITHIN THE PAST 12 MONTHS, YOU WORRIED THAT YOUR FOOD WOULD RUN OUT BEFORE YOU GOT MONEY TO BUY MORE.: NEVER TRUE

## 2022-07-04 SDOH — ECONOMIC STABILITY: INCOME INSECURITY: HOW HARD IS IT FOR YOU TO PAY FOR THE VERY BASICS LIKE FOOD, HOUSING, MEDICAL CARE, AND HEATING?: NOT HARD AT ALL

## 2022-07-04 SDOH — HEALTH STABILITY: MENTAL HEALTH
STRESS IS WHEN SOMEONE FEELS TENSE, NERVOUS, ANXIOUS, OR CAN'T SLEEP AT NIGHT BECAUSE THEIR MIND IS TROUBLED. HOW STRESSED ARE YOU?: ONLY A LITTLE

## 2022-07-04 SDOH — ECONOMIC STABILITY: HOUSING INSECURITY
IN THE LAST 12 MONTHS, WAS THERE A TIME WHEN YOU DID NOT HAVE A STEADY PLACE TO SLEEP OR SLEPT IN A SHELTER (INCLUDING NOW)?: NO

## 2022-07-04 SDOH — HEALTH STABILITY: PHYSICAL HEALTH: ON AVERAGE, HOW MANY DAYS PER WEEK DO YOU ENGAGE IN MODERATE TO STRENUOUS EXERCISE (LIKE A BRISK WALK)?: 1 DAY

## 2022-07-04 SDOH — ECONOMIC STABILITY: TRANSPORTATION INSECURITY
IN THE PAST 12 MONTHS, HAS LACK OF RELIABLE TRANSPORTATION KEPT YOU FROM MEDICAL APPOINTMENTS, MEETINGS, WORK OR FROM GETTING THINGS NEEDED FOR DAILY LIVING?: NO

## 2022-07-04 SDOH — ECONOMIC STABILITY: TRANSPORTATION INSECURITY
IN THE PAST 12 MONTHS, HAS THE LACK OF TRANSPORTATION KEPT YOU FROM MEDICAL APPOINTMENTS OR FROM GETTING MEDICATIONS?: NO

## 2022-07-04 SDOH — ECONOMIC STABILITY: TRANSPORTATION INSECURITY
IN THE PAST 12 MONTHS, HAS LACK OF TRANSPORTATION KEPT YOU FROM MEETINGS, WORK, OR FROM GETTING THINGS NEEDED FOR DAILY LIVING?: NO

## 2022-07-04 SDOH — ECONOMIC STABILITY: INCOME INSECURITY: IN THE LAST 12 MONTHS, WAS THERE A TIME WHEN YOU WERE NOT ABLE TO PAY THE MORTGAGE OR RENT ON TIME?: NO

## 2022-07-04 ASSESSMENT — SOCIAL DETERMINANTS OF HEALTH (SDOH)
HOW OFTEN DO YOU GET TOGETHER WITH FRIENDS OR RELATIVES?: ONCE A WEEK
HOW OFTEN DO YOU GET TOGETHER WITH FRIENDS OR RELATIVES?: ONCE A WEEK
DO YOU BELONG TO ANY CLUBS OR ORGANIZATIONS SUCH AS CHURCH GROUPS UNIONS, FRATERNAL OR ATHLETIC GROUPS, OR SCHOOL GROUPS?: NO
HOW OFTEN DO YOU HAVE A DRINK CONTAINING ALCOHOL: 4 OR MORE TIMES A WEEK
DO YOU BELONG TO ANY CLUBS OR ORGANIZATIONS SUCH AS CHURCH GROUPS UNIONS, FRATERNAL OR ATHLETIC GROUPS, OR SCHOOL GROUPS?: NO
HOW OFTEN DO YOU ATTEND CHURCH OR RELIGIOUS SERVICES?: 1 TO 4 TIMES PER YEAR
IN A TYPICAL WEEK, HOW MANY TIMES DO YOU TALK ON THE PHONE WITH FAMILY, FRIENDS, OR NEIGHBORS?: NEVER
HOW HARD IS IT FOR YOU TO PAY FOR THE VERY BASICS LIKE FOOD, HOUSING, MEDICAL CARE, AND HEATING?: NOT HARD AT ALL
WITHIN THE PAST 12 MONTHS, YOU WORRIED THAT YOUR FOOD WOULD RUN OUT BEFORE YOU GOT THE MONEY TO BUY MORE: NEVER TRUE
HOW OFTEN DO YOU HAVE SIX OR MORE DRINKS ON ONE OCCASION: MONTHLY
HOW MANY DRINKS CONTAINING ALCOHOL DO YOU HAVE ON A TYPICAL DAY WHEN YOU ARE DRINKING: 5 OR 6
HOW OFTEN DO YOU ATTEND CHURCH OR RELIGIOUS SERVICES?: 1 TO 4 TIMES PER YEAR
IN A TYPICAL WEEK, HOW MANY TIMES DO YOU TALK ON THE PHONE WITH FAMILY, FRIENDS, OR NEIGHBORS?: NEVER

## 2022-07-04 ASSESSMENT — LIFESTYLE VARIABLES
SKIP TO QUESTIONS 9-10: 0
HOW MANY STANDARD DRINKS CONTAINING ALCOHOL DO YOU HAVE ON A TYPICAL DAY: 5 OR 6
HOW OFTEN DO YOU HAVE SIX OR MORE DRINKS ON ONE OCCASION: MONTHLY
AUDIT-C TOTAL SCORE: 8
HOW OFTEN DO YOU HAVE A DRINK CONTAINING ALCOHOL: 4 OR MORE TIMES A WEEK

## 2022-07-05 ENCOUNTER — OFFICE VISIT (OUTPATIENT)
Dept: MEDICAL GROUP | Age: 72
End: 2022-07-05
Payer: MEDICARE

## 2022-07-05 VITALS
HEIGHT: 67 IN | SYSTOLIC BLOOD PRESSURE: 120 MMHG | OXYGEN SATURATION: 100 % | TEMPERATURE: 98 F | WEIGHT: 162.4 LBS | RESPIRATION RATE: 16 BRPM | BODY MASS INDEX: 25.49 KG/M2 | HEART RATE: 91 BPM | DIASTOLIC BLOOD PRESSURE: 82 MMHG

## 2022-07-05 DIAGNOSIS — E55.9 VITAMIN D DEFICIENCY: ICD-10-CM

## 2022-07-05 DIAGNOSIS — N40.0 BENIGN PROSTATIC HYPERPLASIA WITHOUT LOWER URINARY TRACT SYMPTOMS: ICD-10-CM

## 2022-07-05 DIAGNOSIS — H90.0 CONDUCTIVE HEARING LOSS, BILATERAL: ICD-10-CM

## 2022-07-05 DIAGNOSIS — R53.82 CHRONIC FATIGUE: ICD-10-CM

## 2022-07-05 DIAGNOSIS — E78.00 PURE HYPERCHOLESTEROLEMIA: ICD-10-CM

## 2022-07-05 DIAGNOSIS — R97.20 ELEVATED PSA: ICD-10-CM

## 2022-07-05 PROCEDURE — 99214 OFFICE O/P EST MOD 30 MIN: CPT | Performed by: FAMILY MEDICINE

## 2022-07-05 RX ORDER — LATANOPROST 50 UG/ML
SOLUTION/ DROPS OPHTHALMIC
COMMUNITY
Start: 2022-04-12

## 2022-07-05 RX ORDER — TIMOLOL MALEATE/LATANOPROST/PF 0.5-0.005%
DROPS OPHTHALMIC (EYE)
COMMUNITY
Start: 2020-06-01 | End: 2022-07-05

## 2022-07-05 ASSESSMENT — PATIENT HEALTH QUESTIONNAIRE - PHQ9: CLINICAL INTERPRETATION OF PHQ2 SCORE: 0

## 2022-07-05 ASSESSMENT — FIBROSIS 4 INDEX: FIB4 SCORE: 1.1

## 2022-07-05 NOTE — PROGRESS NOTES
This medical record contains text that has been entered with the assistance of computer voice recognition and dictation software.  Therefore, it may contain unintended errors in text, spelling, punctuation, or grammar      Chief Complaint   Patient presents with   • Memory Loss     Forgetting recent memories, not too frequent, family hx of dementia, trouble finishing words         Vickey Estes is a 71 y.o. male here evaluation and management of: Routine follow-up      HPI:     1. Chronic fatigue  NEW UNDIAGNOSED PROBLEM    Dandre is a very pleasant 71-year-old male who presents to clinic with chief complaint of having fatigue.  He states that every afternoon 3 or 4:00 often sit down and take a nap for 30 minutes to 60 minutes.  He states that he is not sure if this was from becoming older.  He denies any dyspnea exertion or shortness of breath no fevers chills night sweats.    2. Pure hypercholesterolemia  Simvastatin 80 mg p.o. nightly    The patient has been on a statin for years and tolerating this fine. The patient denies any muscle aches, no abdominal pain and no history of elevated liver enzymes.    3. Conductive hearing loss, bilateral  NEW UNDIAGNOSED PROBLEM    Patient states that when there is 2 people talking right  He cannot understand any one of them.  Also if he is at a dinner table and there is many people talking is hard for him to hear.        Current medicines (including changes today)  Current Outpatient Medications   Medication Sig Dispense Refill   • latanoprost (XALATAN) 0.005 % Solution      • hydroCHLOROthiazide (HYDRODIURIL) 25 MG Tab Take 1 Tablet by mouth every day. 90 Tablet 3   • aspirin (ASA) 81 MG Chew Tab chewable tablet Take 1 tablet p.o. daily with food 90 Tablet 3   • levothyroxine (SYNTHROID) 125 MCG Tab Take 1 Tablet by mouth every morning on an empty stomach. 100 Tablet 3   • simvastatin (ZOCOR) 80 MG tablet Take 1 Tablet by mouth at bedtime. 90 Tablet 3   • ascorbic acid  "(VITAMIN C) 500 MG tablet        No current facility-administered medications for this visit.     He  has a past medical history of Thyroid disease.  He  has a past surgical history that includes hernia repair.  Social History     Tobacco Use   • Smoking status: Never Smoker   • Smokeless tobacco: Never Used   Vaping Use   • Vaping Use: Never used   Substance Use Topics   • Alcohol use: Yes     Alcohol/week: 1.2 - 2.4 oz     Types: 1 - 2 Shots of liquor, 1 - 2 Glasses of wine per week     Comment: every day   • Drug use: No     Social History     Social History Narrative   • Not on file     Family History   Problem Relation Age of Onset   • Heart Attack Mother    • Cancer Father         brain tumor   • Stroke Maternal Grandmother 75   • Heart Disease Paternal Grandmother    • Heart Attack Paternal Grandfather      Family Status   Relation Name Status   • Mo     • Fa     • Bro  Alive   • MGMo     • MGFa     • PGMo     • PGFa           ROS    The pertinent  ROS findings can be seen in the HPI above.     All other systems reviewed and are negative     Objective:     /82 (BP Location: Right arm, Patient Position: Sitting, BP Cuff Size: Adult)   Pulse 91   Temp 36.7 °C (98 °F) (Temporal)   Resp 16   Ht 1.702 m (5' 7\")   Wt 73.7 kg (162 lb 6.4 oz)   SpO2 100%  Body mass index is 25.44 kg/m².      Physical Exam:    Constitutional: Alert, no distress.  Skin: No suspicious lesions  Eye: Equal, round and reactive, conjunctiva clear, lids normal.  ENMT: Lips without lesions, good dentition, oropharynx clear.  Neck: Trachea midline, no masses, no thyromegaly. No cervical or supraclavicular lymphadenopathy.  Respiratory: Unlabored respiratory effort, lungs clear to auscultation, no wheezes, no ronchi.  Cardiovascular: Normal S1, S2, no murmur, no edema  Abdomen: Soft, non-tender, no masses, no hepatosplenomegaly.        Assessment and Plan:   The following treatment " plan was discussed      1. Chronic fatigue    We will obtain new labs to update clinical profile.  Then we will adjust therapy as needed.    - TESTOSTERONE, FREE AND TOTAL; Future    2. Pure hypercholesterolemia  We will obtain new labs to update clinical profile.  Then we will adjust therapy as needed.    - Comp Metabolic Panel; Future  - CBC WITH DIFFERENTIAL; Future  - Lipid Profile; Future    3. Conductive hearing loss, bilateral    We will refer to ENT for appropriate evaluation and treatment    - Referral to ENT    4. Elevated PSA    We will obtain new labs to update clinical profile.  Then we will adjust therapy as needed.    - PROSTATE SPECIFIC AG DIAGNOSTIC; Future      5. Vitamin D deficiency    - VITAMIN D,25 HYDROXY; Future        Instructed to Follow up in clinic or ER for worsening symptoms, difficulty breathing, lack of expected recovery, or should new symptoms or problems arise.    Followup: Return in about 6 months (around 1/5/2023) for Reevaluation, labs.

## 2022-07-08 ENCOUNTER — HOSPITAL ENCOUNTER (OUTPATIENT)
Dept: LAB | Facility: MEDICAL CENTER | Age: 72
End: 2022-07-08
Attending: FAMILY MEDICINE
Payer: MEDICARE

## 2022-07-08 DIAGNOSIS — R97.20 ELEVATED PSA: ICD-10-CM

## 2022-07-08 DIAGNOSIS — E55.9 VITAMIN D DEFICIENCY: ICD-10-CM

## 2022-07-08 DIAGNOSIS — R53.82 CHRONIC FATIGUE: ICD-10-CM

## 2022-07-08 DIAGNOSIS — E78.00 PURE HYPERCHOLESTEROLEMIA: ICD-10-CM

## 2022-07-08 LAB
25(OH)D3 SERPL-MCNC: 29 NG/ML (ref 30–100)
ALBUMIN SERPL BCP-MCNC: 4.5 G/DL (ref 3.2–4.9)
ALBUMIN/GLOB SERPL: 1.7 G/DL
ALP SERPL-CCNC: 62 U/L (ref 30–99)
ALT SERPL-CCNC: 23 U/L (ref 2–50)
ANION GAP SERPL CALC-SCNC: 10 MMOL/L (ref 7–16)
AST SERPL-CCNC: 25 U/L (ref 12–45)
BASOPHILS # BLD AUTO: 0.8 % (ref 0–1.8)
BASOPHILS # BLD: 0.05 K/UL (ref 0–0.12)
BILIRUB SERPL-MCNC: 0.6 MG/DL (ref 0.1–1.5)
BUN SERPL-MCNC: 22 MG/DL (ref 8–22)
CALCIUM SERPL-MCNC: 9.7 MG/DL (ref 8.5–10.5)
CHLORIDE SERPL-SCNC: 103 MMOL/L (ref 96–112)
CHOLEST SERPL-MCNC: 169 MG/DL (ref 100–199)
CO2 SERPL-SCNC: 26 MMOL/L (ref 20–33)
CREAT SERPL-MCNC: 0.83 MG/DL (ref 0.5–1.4)
EOSINOPHIL # BLD AUTO: 0.28 K/UL (ref 0–0.51)
EOSINOPHIL NFR BLD: 4.2 % (ref 0–6.9)
ERYTHROCYTE [DISTWIDTH] IN BLOOD BY AUTOMATED COUNT: 43.4 FL (ref 35.9–50)
FASTING STATUS PATIENT QL REPORTED: NORMAL
GFR SERPLBLD CREATININE-BSD FMLA CKD-EPI: 92 ML/MIN/1.73 M 2
GLOBULIN SER CALC-MCNC: 2.7 G/DL (ref 1.9–3.5)
GLUCOSE SERPL-MCNC: 102 MG/DL (ref 65–99)
HCT VFR BLD AUTO: 48.3 % (ref 42–52)
HDLC SERPL-MCNC: 59 MG/DL
HGB BLD-MCNC: 16.1 G/DL (ref 14–18)
IMM GRANULOCYTES # BLD AUTO: 0.02 K/UL (ref 0–0.11)
IMM GRANULOCYTES NFR BLD AUTO: 0.3 % (ref 0–0.9)
LDLC SERPL CALC-MCNC: 88 MG/DL
LYMPHOCYTES # BLD AUTO: 0.86 K/UL (ref 1–4.8)
LYMPHOCYTES NFR BLD: 13.1 % (ref 22–41)
MCH RBC QN AUTO: 31.5 PG (ref 27–33)
MCHC RBC AUTO-ENTMCNC: 33.3 G/DL (ref 33.7–35.3)
MCV RBC AUTO: 94.5 FL (ref 81.4–97.8)
MONOCYTES # BLD AUTO: 0.97 K/UL (ref 0–0.85)
MONOCYTES NFR BLD AUTO: 14.7 % (ref 0–13.4)
NEUTROPHILS # BLD AUTO: 4.41 K/UL (ref 1.82–7.42)
NEUTROPHILS NFR BLD: 66.9 % (ref 44–72)
NRBC # BLD AUTO: 0 K/UL
NRBC BLD-RTO: 0 /100 WBC
PLATELET # BLD AUTO: 246 K/UL (ref 164–446)
PMV BLD AUTO: 9.5 FL (ref 9–12.9)
POTASSIUM SERPL-SCNC: 4.4 MMOL/L (ref 3.6–5.5)
PROT SERPL-MCNC: 7.2 G/DL (ref 6–8.2)
PSA SERPL-MCNC: 5.85 NG/ML (ref 0–4)
RBC # BLD AUTO: 5.11 M/UL (ref 4.7–6.1)
SODIUM SERPL-SCNC: 139 MMOL/L (ref 135–145)
TRIGL SERPL-MCNC: 112 MG/DL (ref 0–149)
WBC # BLD AUTO: 6.6 K/UL (ref 4.8–10.8)

## 2022-07-08 PROCEDURE — 84402 ASSAY OF FREE TESTOSTERONE: CPT

## 2022-07-08 PROCEDURE — 80053 COMPREHEN METABOLIC PANEL: CPT

## 2022-07-08 PROCEDURE — 84403 ASSAY OF TOTAL TESTOSTERONE: CPT

## 2022-07-08 PROCEDURE — 84153 ASSAY OF PSA TOTAL: CPT

## 2022-07-08 PROCEDURE — 85025 COMPLETE CBC W/AUTO DIFF WBC: CPT

## 2022-07-08 PROCEDURE — 84270 ASSAY OF SEX HORMONE GLOBUL: CPT

## 2022-07-08 PROCEDURE — 82306 VITAMIN D 25 HYDROXY: CPT

## 2022-07-08 PROCEDURE — 36415 COLL VENOUS BLD VENIPUNCTURE: CPT

## 2022-07-08 PROCEDURE — 80061 LIPID PANEL: CPT

## 2022-07-09 LAB
SHBG SERPL-SCNC: 45 NMOL/L (ref 19–76)
TESTOST FREE MFR SERPL: 1.5 % (ref 1.6–2.9)
TESTOST FREE SERPL-MCNC: 58 PG/ML (ref 47–244)
TESTOST SERPL-MCNC: 382 NG/DL (ref 300–720)

## 2022-08-19 ENCOUNTER — TELEPHONE (OUTPATIENT)
Dept: HEALTH INFORMATION MANAGEMENT | Facility: OTHER | Age: 72
End: 2022-08-19

## 2022-11-03 ENCOUNTER — HOSPITAL ENCOUNTER (OUTPATIENT)
Dept: LAB | Facility: MEDICAL CENTER | Age: 72
End: 2022-11-03
Attending: PHYSICIAN ASSISTANT
Payer: MEDICARE

## 2022-11-03 LAB — PSA SERPL-MCNC: 5.43 NG/ML (ref 0–4)

## 2022-11-03 PROCEDURE — 84153 ASSAY OF PSA TOTAL: CPT

## 2022-11-03 PROCEDURE — 36415 COLL VENOUS BLD VENIPUNCTURE: CPT

## 2022-11-18 ENCOUNTER — DOCUMENTATION (OUTPATIENT)
Dept: HEALTH INFORMATION MANAGEMENT | Facility: OTHER | Age: 72
End: 2022-11-18
Payer: MEDICARE

## 2023-03-08 DIAGNOSIS — E78.00 HYPERCHOLESTEREMIA: ICD-10-CM

## 2023-03-08 RX ORDER — SIMVASTATIN 80 MG
80 TABLET ORAL
Qty: 90 TABLET | Refills: 3 | Status: SHIPPED | OUTPATIENT
Start: 2023-03-08 | End: 2024-01-15 | Stop reason: SDUPTHER

## 2023-04-25 ENCOUNTER — TELEPHONE (OUTPATIENT)
Dept: HEALTH INFORMATION MANAGEMENT | Facility: OTHER | Age: 73
End: 2023-04-25
Payer: MEDICARE

## 2023-05-10 ENCOUNTER — APPOINTMENT (OUTPATIENT)
Dept: MEDICAL GROUP | Age: 73
End: 2023-05-10
Payer: MEDICARE

## 2023-06-21 ENCOUNTER — TELEPHONE (OUTPATIENT)
Dept: HEALTH INFORMATION MANAGEMENT | Facility: OTHER | Age: 73
End: 2023-06-21

## 2023-06-22 DIAGNOSIS — I10 ESSENTIAL HYPERTENSION: ICD-10-CM

## 2023-06-22 RX ORDER — HYDROCHLOROTHIAZIDE 25 MG/1
25 TABLET ORAL DAILY
Qty: 90 TABLET | Refills: 3 | Status: SHIPPED | OUTPATIENT
Start: 2023-06-22

## 2023-06-26 ENCOUNTER — PATIENT MESSAGE (OUTPATIENT)
Dept: MEDICAL GROUP | Age: 73
End: 2023-06-26
Payer: MEDICARE

## 2023-06-26 DIAGNOSIS — Z00.00 PREVENTATIVE HEALTH CARE: ICD-10-CM

## 2023-06-27 RX ORDER — COLCHICINE 0.6 MG/1
0.6 TABLET ORAL DAILY
Qty: 30 TABLET | Refills: 3 | Status: SHIPPED | OUTPATIENT
Start: 2023-06-27

## 2023-06-27 RX ORDER — ASPIRIN 81 MG/1
TABLET, CHEWABLE ORAL
Qty: 90 TABLET | Refills: 3 | Status: SHIPPED | OUTPATIENT
Start: 2023-06-27

## 2023-07-31 DIAGNOSIS — E03.9 HYPOTHYROIDISM, UNSPECIFIED TYPE: ICD-10-CM

## 2023-07-31 NOTE — TELEPHONE ENCOUNTER
Received request via: Pharmacy    Was the patient seen in the last year in this department? No    Does the patient have an active prescription (recently filled or refills available) for medication(s) requested? No    Does the patient have snf Plus and need 100 day supply (blood pressure, diabetes and cholesterol meds only)? Yes, quantity updated to 100 days

## 2023-08-01 RX ORDER — LEVOTHYROXINE SODIUM 0.12 MG/1
125 TABLET ORAL
Qty: 100 TABLET | Refills: 3 | Status: SHIPPED | OUTPATIENT
Start: 2023-08-01

## 2023-10-19 ENCOUNTER — OFFICE VISIT (OUTPATIENT)
Dept: MEDICAL GROUP | Age: 73
End: 2023-10-19
Payer: MEDICARE

## 2023-10-19 VITALS
BODY MASS INDEX: 25.74 KG/M2 | SYSTOLIC BLOOD PRESSURE: 130 MMHG | HEIGHT: 67 IN | OXYGEN SATURATION: 97 % | DIASTOLIC BLOOD PRESSURE: 68 MMHG | TEMPERATURE: 97.1 F | HEART RATE: 82 BPM | WEIGHT: 164 LBS

## 2023-10-19 DIAGNOSIS — Z13.6 ENCOUNTER FOR ABDOMINAL AORTIC ANEURYSM (AAA) SCREENING: ICD-10-CM

## 2023-10-19 DIAGNOSIS — F51.01 PRIMARY INSOMNIA: ICD-10-CM

## 2023-10-19 DIAGNOSIS — E29.1 HYPOGONADISM IN MALE: ICD-10-CM

## 2023-10-19 DIAGNOSIS — E78.00 HYPERCHOLESTEREMIA: ICD-10-CM

## 2023-10-19 DIAGNOSIS — Z23 NEED FOR VACCINATION: ICD-10-CM

## 2023-10-19 DIAGNOSIS — J30.2 SEASONAL ALLERGIES: ICD-10-CM

## 2023-10-19 PROCEDURE — 99214 OFFICE O/P EST MOD 30 MIN: CPT | Mod: 25 | Performed by: FAMILY MEDICINE

## 2023-10-19 PROCEDURE — 90662 IIV NO PRSV INCREASED AG IM: CPT | Performed by: FAMILY MEDICINE

## 2023-10-19 PROCEDURE — G0008 ADMIN INFLUENZA VIRUS VAC: HCPCS | Performed by: FAMILY MEDICINE

## 2023-10-19 PROCEDURE — 3078F DIAST BP <80 MM HG: CPT | Performed by: FAMILY MEDICINE

## 2023-10-19 PROCEDURE — 3075F SYST BP GE 130 - 139MM HG: CPT | Performed by: FAMILY MEDICINE

## 2023-10-19 RX ORDER — TRAZODONE HYDROCHLORIDE 100 MG/1
100 TABLET ORAL NIGHTLY
Qty: 100 TABLET | Refills: 0 | Status: SHIPPED | OUTPATIENT
Start: 2023-10-19 | End: 2024-01-24

## 2023-10-19 RX ORDER — FLUTICASONE PROPIONATE 50 MCG
1 SPRAY, SUSPENSION (ML) NASAL DAILY
Qty: 16 G | Refills: 0 | Status: SHIPPED | OUTPATIENT
Start: 2023-10-19

## 2023-10-19 ASSESSMENT — FIBROSIS 4 INDEX: FIB4 SCORE: 1.57

## 2023-10-19 ASSESSMENT — PATIENT HEALTH QUESTIONNAIRE - PHQ9: CLINICAL INTERPRETATION OF PHQ2 SCORE: 0

## 2023-10-19 NOTE — PROGRESS NOTES
This medical record contains text that has been entered with the assistance of computer voice recognition and dictation software.  Therefore, it may contain unintended errors in text, spelling, punctuation, or grammar      Chief Complaint   Patient presents with    Insomnia     Isn't sleeping well          Vickey Estes is a 72 y.o. male here evaluation and management of: Insomnia, seasonal allergies, AAA screening      HPI:           1. Primary insomnia  NEW UNDIAGNOSED PROBLEM    Dandre states that he can go to sleep it is hard for him to stay asleep he often gets up after 1 hour 2 hours of sleeping.  He is tired.  He states his wife takes trazodone which works well for her.    2. Seasonal allergies  Dandre states that he has been sneezing a lot having sneezing fits 5 times in a row.  He has not had in the last 2 weeks he is wondering if he is allergic to something he would like to order an allergy panel.    3. Encounter for abdominal aortic aneurysm (AAA) screening  Patient would like to be screened for aortic aneurysm because of positive family history and he did smoke cigarettes when he was in high school.    4. Hypogonadism in male  Dandre states that his urologist states that it is okay for him to take testosterone despite having a mildly elevated PSA.        Current medicines (including changes today)  Current Outpatient Medications   Medication Sig Dispense Refill    traZODone (DESYREL) 100 MG Tab Take 1 Tablet by mouth every evening. 100 Tablet 0    fluticasone (FLONASE) 50 MCG/ACT nasal spray Administer 1 Spray into affected nostril(S) every day. 16 g 0    levothyroxine (SYNTHROID) 125 MCG Tab Take 1 Tablet by mouth every morning on an empty stomach. 100 Tablet 3    aspirin (ASA) 81 MG Chew Tab chewable tablet Take 1 tablet p.o. daily with food 90 Tablet 3    colchicine (COLCRYS) 0.6 MG Tab Take 1 Tablet by mouth every day. 30 Tablet 3    hydroCHLOROthiazide (HYDRODIURIL) 25 MG Tab Take 1 Tablet by  "mouth every day. 90 Tablet 3    simvastatin (ZOCOR) 80 MG tablet Take 1 Tablet by mouth at bedtime. 90 Tablet 3    latanoprost (XALATAN) 0.005 % Solution        No current facility-administered medications for this visit.     He  has a past medical history of Thyroid disease.  He  has a past surgical history that includes hernia repair.  Social History     Tobacco Use    Smoking status: Never    Smokeless tobacco: Never   Vaping Use    Vaping Use: Never used   Substance Use Topics    Alcohol use: Yes     Alcohol/week: 1.2 - 2.4 oz     Types: 1 - 2 Shots of liquor, 1 - 2 Glasses of wine per week     Comment: every day    Drug use: No     Social History     Social History Narrative    Not on file     Family History   Problem Relation Age of Onset    Heart Attack Mother     Cancer Father         brain tumor    Stroke Maternal Grandmother 75    Heart Disease Paternal Grandmother     Heart Attack Paternal Grandfather      Family Status   Relation Name Status    Mo      Fa      Bro  Alive    MGMo      MGFa      PGMo      PGFa           ROS    The pertinent  ROS findings can be seen in the HPI above.     All other systems reviewed and are negative     Objective:     /68 (BP Location: Right arm, Patient Position: Sitting, BP Cuff Size: Adult)   Pulse 82   Temp 36.2 °C (97.1 °F) (Temporal)   Ht 1.702 m (5' 7\")   Wt 74.4 kg (164 lb)   SpO2 97%  Body mass index is 25.69 kg/m².      Physical Exam:    Constitutional: Alert, no distress.  Skin: No suspicious lesions  Eye: Equal, round and reactive, conjunctiva clear, lids normal.  ENMT: Lips without lesions, good dentition, oropharynx clear.  Neck: Trachea midline, no masses, no thyromegaly. No cervical or supraclavicular lymphadenopathy.  Respiratory: Unlabored respiratory effort, lungs clear to auscultation, no wheezes, no ronchi.  Cardiovascular: Normal S1, S2, no murmur, no edema  Abdomen: Soft, non-tender, no masses, " no hepatosplenomegaly.        Assessment and Plan:   The following treatment plan was discussed    All recent labs and provider notes reviewed    1. Primary insomnia    We will use a clinical trial of trazodone  All side effects explained    - traZODone (DESYREL) 100 MG Tab; Take 1 Tablet by mouth every evening.  Dispense: 100 Tablet; Refill: 0    2. Seasonal allergies    We reviewed a video on how to perform nasal saline rinsing  I recommended doing that prior to using Flonase    - fluticasone (FLONASE) 50 MCG/ACT nasal spray; Administer 1 Spray into affected nostril(S) every day.  Dispense: 16 g; Refill: 0  - ALLERGY ZONE 15    3. Encounter for abdominal aortic aneurysm (AAA) screening  - US-ABDOMINAL AORTA W/O DOPPLER; Future    4. Hypogonadism in male    We will begin testosterone therapy in the future he states    5. Need for vaccination  - INFLUENZA VACCINE, HIGH DOSE (65+ ONLY)            Instructed to Follow up in clinic or ER for worsening symptoms, difficulty breathing, lack of expected recovery, or should new symptoms or problems arise.    Followup: Return in about 6 months (around 4/19/2024) for Reevaluation, labs.

## 2023-10-23 ENCOUNTER — HOSPITAL ENCOUNTER (OUTPATIENT)
Dept: LAB | Facility: MEDICAL CENTER | Age: 73
End: 2023-10-23
Attending: FAMILY MEDICINE
Payer: MEDICARE

## 2023-10-23 PROCEDURE — 36415 COLL VENOUS BLD VENIPUNCTURE: CPT

## 2023-10-23 PROCEDURE — 86003 ALLG SPEC IGE CRUDE XTRC EA: CPT | Mod: 91

## 2023-10-23 PROCEDURE — 82785 ASSAY OF IGE: CPT

## 2023-10-26 LAB
A ALTERNATA IGE QN: <0.1 KU/L
A FUMIGATUS IGE QN: <0.1 KU/L
BERMUDA GRASS IGE QN: <0.1 KU/L
BOXELDER IGE QN: <0.1 KU/L
C SPHAEROSPERMUM IGE QN: <0.1 KU/L
CAT DANDER IGE QN: <0.1 KU/L
CMN PIGWEED IGE QN: <0.1 KU/L
COMMON RAGWEED IGE QN: <0.1 KU/L
COTTONWOOD IGE QN: <0.1 KU/L
D FARINAE IGE QN: <0.1 KU/L
D PTERONYSS IGE QN: <0.1 KU/L
DEPRECATED MISC ALLERGEN IGE RAST QL: NORMAL
DOG DANDER IGE QN: <0.1 KU/L
IGE SERPL-ACNC: 6 KU/L
M RACEMOSUS IGE QN: <0.1 KU/L
MOUSE EPITH IGE QN: <0.1 KU/L
MT JUNIPER IGE QN: <0.1 KU/L
MUGWORT IGE QN: <0.1 KU/L
OLIVE POLN IGE QN: <0.1 KU/L
P NOTATUM IGE QN: <0.1 KU/L
ROACH IGE QN: <0.1 KU/L
SALTWORT IGE QN: <0.1 KU/L
TIMOTHY IGE QN: <0.1 KU/L
WHITE ELM IGE QN: <0.1 KU/L
WHITE MULBERRY IGE QN: <0.1 KU/L
WHITE OAK IGE QN: <0.1 KU/L

## 2023-11-16 ENCOUNTER — HOSPITAL ENCOUNTER (OUTPATIENT)
Dept: RADIOLOGY | Facility: MEDICAL CENTER | Age: 73
End: 2023-11-16
Attending: FAMILY MEDICINE
Payer: MEDICARE

## 2023-11-16 DIAGNOSIS — Z13.6 ENCOUNTER FOR ABDOMINAL AORTIC ANEURYSM (AAA) SCREENING: ICD-10-CM

## 2023-11-16 PROCEDURE — 76706 US ABDL AORTA SCREEN AAA: CPT

## 2023-11-29 ENCOUNTER — PATIENT MESSAGE (OUTPATIENT)
Dept: MEDICAL GROUP | Age: 73
End: 2023-11-29
Payer: MEDICARE

## 2023-11-29 DIAGNOSIS — N40.0 BENIGN PROSTATIC HYPERPLASIA, UNSPECIFIED WHETHER LOWER URINARY TRACT SYMPTOMS PRESENT: ICD-10-CM

## 2023-11-29 DIAGNOSIS — E78.00 PURE HYPERCHOLESTEROLEMIA: ICD-10-CM

## 2023-12-20 ENCOUNTER — HOSPITAL ENCOUNTER (OUTPATIENT)
Dept: LAB | Facility: MEDICAL CENTER | Age: 73
End: 2023-12-20
Attending: FAMILY MEDICINE
Payer: MEDICARE

## 2023-12-20 DIAGNOSIS — E78.00 PURE HYPERCHOLESTEROLEMIA: ICD-10-CM

## 2023-12-20 DIAGNOSIS — N40.0 BENIGN PROSTATIC HYPERPLASIA, UNSPECIFIED WHETHER LOWER URINARY TRACT SYMPTOMS PRESENT: ICD-10-CM

## 2023-12-20 LAB
ALBUMIN SERPL BCP-MCNC: 4.3 G/DL (ref 3.2–4.9)
ALBUMIN/GLOB SERPL: 1.7 G/DL
ALP SERPL-CCNC: 63 U/L (ref 30–99)
ALT SERPL-CCNC: 21 U/L (ref 2–50)
ANION GAP SERPL CALC-SCNC: 11 MMOL/L (ref 7–16)
AST SERPL-CCNC: 24 U/L (ref 12–45)
BASOPHILS # BLD AUTO: 0.6 % (ref 0–1.8)
BASOPHILS # BLD: 0.04 K/UL (ref 0–0.12)
BILIRUB SERPL-MCNC: 0.3 MG/DL (ref 0.1–1.5)
BUN SERPL-MCNC: 16 MG/DL (ref 8–22)
CALCIUM ALBUM COR SERPL-MCNC: 9.4 MG/DL (ref 8.5–10.5)
CALCIUM SERPL-MCNC: 9.6 MG/DL (ref 8.5–10.5)
CHLORIDE SERPL-SCNC: 100 MMOL/L (ref 96–112)
CHOLEST SERPL-MCNC: 167 MG/DL (ref 100–199)
CO2 SERPL-SCNC: 26 MMOL/L (ref 20–33)
CREAT SERPL-MCNC: 0.98 MG/DL (ref 0.5–1.4)
EOSINOPHIL # BLD AUTO: 0.3 K/UL (ref 0–0.51)
EOSINOPHIL NFR BLD: 4.7 % (ref 0–6.9)
ERYTHROCYTE [DISTWIDTH] IN BLOOD BY AUTOMATED COUNT: 44.2 FL (ref 35.9–50)
FASTING STATUS PATIENT QL REPORTED: NORMAL
GFR SERPLBLD CREATININE-BSD FMLA CKD-EPI: 80 ML/MIN/1.73 M 2
GLOBULIN SER CALC-MCNC: 2.6 G/DL (ref 1.9–3.5)
GLUCOSE SERPL-MCNC: 104 MG/DL (ref 65–99)
HCT VFR BLD AUTO: 42.4 % (ref 42–52)
HDLC SERPL-MCNC: 63 MG/DL
HGB BLD-MCNC: 14 G/DL (ref 14–18)
IMM GRANULOCYTES # BLD AUTO: 0.03 K/UL (ref 0–0.11)
IMM GRANULOCYTES NFR BLD AUTO: 0.5 % (ref 0–0.9)
LDLC SERPL CALC-MCNC: 87 MG/DL
LYMPHOCYTES # BLD AUTO: 0.84 K/UL (ref 1–4.8)
LYMPHOCYTES NFR BLD: 13.2 % (ref 22–41)
MCH RBC QN AUTO: 30.2 PG (ref 27–33)
MCHC RBC AUTO-ENTMCNC: 33 G/DL (ref 32.3–36.5)
MCV RBC AUTO: 91.4 FL (ref 81.4–97.8)
MONOCYTES # BLD AUTO: 1 K/UL (ref 0–0.85)
MONOCYTES NFR BLD AUTO: 15.7 % (ref 0–13.4)
NEUTROPHILS # BLD AUTO: 4.17 K/UL (ref 1.82–7.42)
NEUTROPHILS NFR BLD: 65.3 % (ref 44–72)
NRBC # BLD AUTO: 0 K/UL
NRBC BLD-RTO: 0 /100 WBC (ref 0–0.2)
PLATELET # BLD AUTO: 285 K/UL (ref 164–446)
PMV BLD AUTO: 9.3 FL (ref 9–12.9)
POTASSIUM SERPL-SCNC: 4.5 MMOL/L (ref 3.6–5.5)
PROT SERPL-MCNC: 6.9 G/DL (ref 6–8.2)
PSA SERPL-MCNC: 6.11 NG/ML (ref 0–4)
RBC # BLD AUTO: 4.64 M/UL (ref 4.7–6.1)
SODIUM SERPL-SCNC: 137 MMOL/L (ref 135–145)
T3FREE SERPL-MCNC: 2.48 PG/ML (ref 2–4.4)
T4 FREE SERPL-MCNC: 1.3 NG/DL (ref 0.93–1.7)
TRIGL SERPL-MCNC: 87 MG/DL (ref 0–149)
TSH SERPL DL<=0.005 MIU/L-ACNC: 0.24 UIU/ML (ref 0.38–5.33)
WBC # BLD AUTO: 6.4 K/UL (ref 4.8–10.8)

## 2023-12-20 PROCEDURE — 84153 ASSAY OF PSA TOTAL: CPT

## 2023-12-20 PROCEDURE — 80061 LIPID PANEL: CPT

## 2023-12-20 PROCEDURE — 84443 ASSAY THYROID STIM HORMONE: CPT

## 2023-12-20 PROCEDURE — 85025 COMPLETE CBC W/AUTO DIFF WBC: CPT

## 2023-12-20 PROCEDURE — 84481 FREE ASSAY (FT-3): CPT

## 2023-12-20 PROCEDURE — 84439 ASSAY OF FREE THYROXINE: CPT

## 2023-12-20 PROCEDURE — 80053 COMPREHEN METABOLIC PANEL: CPT

## 2023-12-20 PROCEDURE — 36415 COLL VENOUS BLD VENIPUNCTURE: CPT

## 2024-01-03 DIAGNOSIS — R97.20 ELEVATED PSA: ICD-10-CM

## 2024-01-15 DIAGNOSIS — E78.00 HYPERCHOLESTEREMIA: ICD-10-CM

## 2024-01-16 RX ORDER — SIMVASTATIN 80 MG
80 TABLET ORAL
Qty: 90 TABLET | Refills: 3 | Status: SHIPPED | OUTPATIENT
Start: 2024-01-16

## 2024-01-24 DIAGNOSIS — F51.01 PRIMARY INSOMNIA: ICD-10-CM

## 2024-01-24 RX ORDER — TRAZODONE HYDROCHLORIDE 100 MG/1
100 TABLET ORAL EVERY EVENING
Qty: 100 TABLET | Refills: 2 | Status: SHIPPED | OUTPATIENT
Start: 2024-01-24

## 2024-01-24 NOTE — TELEPHONE ENCOUNTER
Received request via: Patient    Was the patient seen in the last year in this department? Yes    Does the patient have an active prescription (recently filled or refills available) for medication(s) requested? No    [facility]     Does the patient have detention Plus and need 100 day supply (blood pressure, diabetes and cholesterol meds only)? Medication is not for cholesterol, blood pressure or diabetes

## 2024-02-14 ENCOUNTER — OFFICE VISIT (OUTPATIENT)
Dept: UROLOGY | Facility: MEDICAL CENTER | Age: 74
End: 2024-02-14
Payer: MEDICARE

## 2024-02-14 VITALS
DIASTOLIC BLOOD PRESSURE: 98 MMHG | SYSTOLIC BLOOD PRESSURE: 160 MMHG | TEMPERATURE: 99.3 F | WEIGHT: 165.6 LBS | HEART RATE: 78 BPM | HEIGHT: 67 IN | OXYGEN SATURATION: 96 % | BODY MASS INDEX: 25.99 KG/M2

## 2024-02-14 DIAGNOSIS — R35.1 BENIGN PROSTATIC HYPERPLASIA WITH NOCTURIA: ICD-10-CM

## 2024-02-14 DIAGNOSIS — R97.20 ELEVATED PSA: ICD-10-CM

## 2024-02-14 DIAGNOSIS — N40.1 BENIGN PROSTATIC HYPERPLASIA WITH NOCTURIA: ICD-10-CM

## 2024-02-14 LAB
POC POST-VOID: 65 ML
POC PRE-VOID: 124 ML

## 2024-02-14 PROCEDURE — 99204 OFFICE O/P NEW MOD 45 MIN: CPT

## 2024-02-14 PROCEDURE — 51798 US URINE CAPACITY MEASURE: CPT

## 2024-02-14 RX ORDER — MULTIVIT-MIN/IRON/FOLIC ACID/K 18-600-40
CAPSULE ORAL
COMMUNITY

## 2024-02-14 RX ORDER — TAMSULOSIN HYDROCHLORIDE 0.4 MG/1
0.4 CAPSULE ORAL
Qty: 30 CAPSULE | Refills: 3 | Status: SHIPPED | OUTPATIENT
Start: 2024-02-14

## 2024-02-14 ASSESSMENT — FIBROSIS 4 INDEX: FIB4 SCORE: 1.38

## 2024-02-14 NOTE — PROGRESS NOTES
urolo        Subjective  Christopher Mani Estes is a 73 y.o. male who presents today for evaluation of BPH with LUTS and elevated PSA    PSA in November 2022 was 5.43 and in 12/20/2023 6.11. Patient is reporting that he at times has hesitancy-difficulty starting his stream, a weak urinary stream, nocturia-voiding 2-3 times per night, dribbling, and urgency without leakage. Patient reports regular bowel movements with occassional constipation and he takes OTC colace or miralax for that.     Patient denies hematuria and incontinence. Patient does report that he is ahving worsening symptoms of BPH    Patient denies ED- reporting that he is able to get an ereciton, maintain and complete.    Patient denies family history of prostate CA and bladder CA    Family History   Problem Relation Age of Onset    Heart Attack Mother     Cancer Father         brain tumor    Stroke Maternal Grandmother 75    Heart Disease Paternal Grandmother     Heart Attack Paternal Grandfather        Social History     Socioeconomic History    Marital status:      Spouse name: Not on file    Number of children: Not on file    Years of education: Not on file    Highest education level: Bachelor's degree (e.g., BA, AB, BS)   Occupational History    Not on file   Tobacco Use    Smoking status: Never    Smokeless tobacco: Never   Vaping Use    Vaping Use: Never used   Substance and Sexual Activity    Alcohol use: Yes     Alcohol/week: 1.2 - 2.4 oz     Types: 1 - 2 Shots of liquor, 1 - 2 Glasses of wine per week     Comment: every day    Drug use: No    Sexual activity: Yes     Partners: Female   Other Topics Concern    Not on file   Social History Narrative    Not on file     Social Determinants of Health     Financial Resource Strain: Low Risk  (7/4/2022)    Overall Financial Resource Strain (CARDIA)     Difficulty of Paying Living Expenses: Not hard at all   Food Insecurity: No Food Insecurity (7/4/2022)    Hunger Vital Sign     Worried  About Running Out of Food in the Last Year: Never true     Ran Out of Food in the Last Year: Never true   Transportation Needs: No Transportation Needs (7/4/2022)    PRAPARE - Transportation     Lack of Transportation (Medical): No     Lack of Transportation (Non-Medical): No   Physical Activity: Insufficiently Active (7/4/2022)    Exercise Vital Sign     Days of Exercise per Week: 1 day     Minutes of Exercise per Session: 20 min   Stress: No Stress Concern Present (7/4/2022)    [employer] [place] [employer] - Occupational Stress Questionnaire     Feeling of Stress : Only a little   Social Connections: Moderately Isolated (7/4/2022)    Social Connection and Isolation Panel [NHANES]     Frequency of Communication with Friends and Family: Never     Frequency of Social Gatherings with Friends and Family: Once a week     Attends Church Services: 1 to 4 times per year     Active Member of Clubs or Organizations: No     Attends Club or Organization Meetings: Not on file     Marital Status:    Intimate Partner Violence: Not on file   Housing Stability: Low Risk  (7/4/2022)    Housing Stability Vital Sign     Unable to Pay for Housing in the Last Year: No     Number of Places Lived in the Last Year: 1     Unstable Housing in the Last Year: No       Past Surgical History:   Procedure Laterality Date    HERNIA REPAIR         Past Medical History:   Diagnosis Date    Thyroid disease     hypothyroidism       Current Outpatient Medications   Medication Sig Dispense Refill    tamsulosin (FLOMAX) 0.4 MG capsule Take 1 Capsule by mouth 1/2 hour after breakfast. 30 Capsule 3    simvastatin (ZOCOR) 80 MG tablet Take 1 Tablet by mouth at bedtime. 90 Tablet 3    fluticasone (FLONASE) 50 MCG/ACT nasal spray Administer 1 Spray into affected nostril(S) every day. 16 g 0    levothyroxine (SYNTHROID) 125 MCG Tab Take 1 Tablet by mouth every morning on an empty stomach. 100 Tablet 3    aspirin (ASA) 81 MG Chew Tab  "chewable tablet Take 1 tablet p.o. daily with food 90 Tablet 3    colchicine (COLCRYS) 0.6 MG Tab Take 1 Tablet by mouth every day. 30 Tablet 3    hydroCHLOROthiazide (HYDRODIURIL) 25 MG Tab Take 1 Tablet by mouth every day. 90 Tablet 3    latanoprost (XALATAN) 0.005 % Solution       Ascorbic Acid (VITAMIN C) 500 MG Cap       traZODone (DESYREL) 100 MG Tab TAKE ONE TABLET BY MOUTH EVERY EVENING (Patient not taking: Reported on 2/14/2024) 100 Tablet 2     No current facility-administered medications for this visit.       No Known Allergies    Objective  BP (!) 160/98 (BP Location: Left arm, Patient Position: Sitting, BP Cuff Size: Adult)   Pulse 78   Temp 37.4 °C (99.3 °F) (Temporal)   Ht 1.702 m (5' 7\")   Wt 75.1 kg (165 lb 9.6 oz)   SpO2 96%   Physical Exam  Constitutional:       Appearance: Normal appearance.   Eyes:      Extraocular Movements: Extraocular movements intact.   Pulmonary:      Effort: Pulmonary effort is normal.   Genitourinary:     Comments: Pre 144  Post 65  Skin:     General: Skin is warm and dry.   Neurological:      Mental Status: He is alert.         Labs:   PSA   Lab Results   Component Value Date/Time    PSATOTAL 6.11 (H) 12/20/2023 0734           Imaging:   none    Assessment    At this time, I have ordered an MRI of prostate to ensure there are no lesions.    For BPH with LUTS, I have started patient on Flomax 0.4mgPO QD. I have educated patient on side effects such as retrograde ejaculation and dizziness. Patient may stop these medications without tapering.    RTC in 6 weeks for medication evaluation and MRI results    Plan    Problem List Items Addressed This Visit       Elevated PSA    Relevant Orders    POCT Bladder Scan (Completed)    MR-RECTAL/PROSTATE PROTOCOL     Other Visit Diagnoses       Benign prostatic hyperplasia with nocturia        Relevant Medications    tamsulosin (FLOMAX) 0.4 MG capsule            "

## 2024-03-26 ENCOUNTER — HOSPITAL ENCOUNTER (OUTPATIENT)
Dept: RADIOLOGY | Facility: MEDICAL CENTER | Age: 74
End: 2024-03-26
Payer: MEDICARE

## 2024-03-26 DIAGNOSIS — R97.20 ELEVATED PSA: ICD-10-CM

## 2024-03-26 PROCEDURE — 72197 MRI PELVIS W/O & W/DYE: CPT

## 2024-03-26 PROCEDURE — 700111 HCHG RX REV CODE 636 W/ 250 OVERRIDE (IP): Mod: JZ | Performed by: RADIOLOGY

## 2024-03-26 PROCEDURE — A9579 GAD-BASE MR CONTRAST NOS,1ML: HCPCS

## 2024-03-26 PROCEDURE — 700117 HCHG RX CONTRAST REV CODE 255

## 2024-03-26 RX ADMIN — GLUCAGON 1 MG: 1 INJECTION, POWDER, LYOPHILIZED, FOR SOLUTION INTRAMUSCULAR; INTRAVENOUS at 13:40

## 2024-03-26 RX ADMIN — GADOTERIDOL 15 ML: 279.3 INJECTION, SOLUTION INTRAVENOUS at 14:40

## 2024-03-27 ENCOUNTER — TELEPHONE (OUTPATIENT)
Dept: UROLOGY | Facility: MEDICAL CENTER | Age: 74
End: 2024-03-27
Payer: MEDICARE

## 2024-03-27 DIAGNOSIS — R97.20 ELEVATED PSA: ICD-10-CM

## 2024-03-27 RX ORDER — DIAZEPAM 5 MG/1
10 TABLET ORAL ONCE
Qty: 2 TABLET | Refills: 0 | Status: SHIPPED | OUTPATIENT
Start: 2024-03-27 | End: 2024-03-27

## 2024-03-27 RX ORDER — BISACODYL 10 MG
10 SUPPOSITORY, RECTAL RECTAL ONCE
Qty: 1 SUPPOSITORY | Refills: 0 | Status: SHIPPED | OUTPATIENT
Start: 2024-03-27 | End: 2024-03-27

## 2024-03-27 NOTE — TELEPHONE ENCOUNTER
Call to patient regarding MRI results. PIRADS 4. We have discussed a biopsy for this and I have discussed with patient the process and what to expect.    We also discussed his medication that he started for nocturia and frequency. He reports that his symptoms have improved and he would like to continue this medication.    He would like to cancel appointment on Friday and I have agreed that we can plan on seeing him back in 6 months to review symptoms with medication or he can make an appointment sooner if he is having any new developed symptoms.    I have sent a message to  to schedule his TRUS with biopsy.

## 2024-04-05 ENCOUNTER — OFFICE VISIT (OUTPATIENT)
Dept: MEDICAL GROUP | Age: 74
End: 2024-04-05
Payer: MEDICARE

## 2024-04-05 VITALS
HEART RATE: 92 BPM | BODY MASS INDEX: 25.9 KG/M2 | OXYGEN SATURATION: 98 % | TEMPERATURE: 97.5 F | SYSTOLIC BLOOD PRESSURE: 140 MMHG | DIASTOLIC BLOOD PRESSURE: 80 MMHG | HEIGHT: 67 IN | WEIGHT: 165 LBS

## 2024-04-05 DIAGNOSIS — R93.89 ABNORMAL MRI: ICD-10-CM

## 2024-04-05 DIAGNOSIS — M65.311 TRIGGER FINGER OF RIGHT THUMB: ICD-10-CM

## 2024-04-05 PROCEDURE — 3077F SYST BP >= 140 MM HG: CPT | Performed by: FAMILY MEDICINE

## 2024-04-05 PROCEDURE — 20550 NJX 1 TENDON SHEATH/LIGAMENT: CPT | Performed by: FAMILY MEDICINE

## 2024-04-05 PROCEDURE — 3079F DIAST BP 80-89 MM HG: CPT | Performed by: FAMILY MEDICINE

## 2024-04-05 PROCEDURE — 99214 OFFICE O/P EST MOD 30 MIN: CPT | Mod: 25 | Performed by: FAMILY MEDICINE

## 2024-04-05 RX ORDER — DIAZEPAM 5 MG/1
5 TABLET ORAL
COMMUNITY
Start: 2024-03-27

## 2024-04-05 RX ORDER — PREDNISONE 20 MG/1
20 TABLET ORAL
COMMUNITY

## 2024-04-05 ASSESSMENT — PATIENT HEALTH QUESTIONNAIRE - PHQ9: CLINICAL INTERPRETATION OF PHQ2 SCORE: 0

## 2024-04-05 ASSESSMENT — FIBROSIS 4 INDEX: FIB4 SCORE: 1.38

## 2024-04-05 NOTE — PROGRESS NOTES
This medical record contains text that has been entered with the assistance of computer voice recognition and dictation software.  Therefore, it may contain unintended errors in text, spelling, punctuation, or grammar      Chief Complaint   Patient presents with    Follow-Up     Soreness on thumb-- pt noticed it after MRI and noticed that his thumb was acting up after 3-4 days         Yovany Estes is a 73 y.o. male here evaluation and management of: u      HPI:           1. Abnormal MRI      2. Trigger finger of right thumb    History of Present Illness  The patient presents for evaluation of multiple medical concerns.    The patient reports experiencing discomfort in his thumb, characterized by limited range of motion. However, he experiences nocturnal stiffness, necessitating manual repositioning of the joint. This symptom is reminiscent of a trigger finger.    The patient recounts an incident where a saline in his vein ruptured, resulting in swelling, redness, and soreness. He ceased an MRI at that time. Subsequently, approximately five days later, he began to experience nocturnal joint freezing. Prior to the MRI, the patient had been in good health. An MRI of the prostate was conducted by Verona Donato, and a biopsy has been scheduled. The patient expresses a desire to return to Dr. Felisha Heaton at [facility], who has been overseeing his care for the past five years. He is scheduled for a biopsy in the middle of 05/2024 and is inquiring about the necessity of another PSA test.        Current medicines (including changes today)  Current Outpatient Medications   Medication Sig Dispense Refill    artificial tears (EYE LUBRICANT) Ointment ophth ointment Apply 1 Application to both eyes every 8 hours. Systane PF      tamsulosin (FLOMAX) 0.4 MG capsule Take 1 Capsule by mouth 1/2 hour after breakfast. 30 Capsule 3    traZODone (DESYREL) 100 MG Tab TAKE ONE TABLET BY MOUTH EVERY EVENING 100 Tablet  2    simvastatin (ZOCOR) 80 MG tablet Take 1 Tablet by mouth at bedtime. 90 Tablet 3    fluticasone (FLONASE) 50 MCG/ACT nasal spray Administer 1 Spray into affected nostril(S) every day. 16 g 0    levothyroxine (SYNTHROID) 125 MCG Tab Take 1 Tablet by mouth every morning on an empty stomach. 100 Tablet 3    aspirin (ASA) 81 MG Chew Tab chewable tablet Take 1 tablet p.o. daily with food 90 Tablet 3    colchicine (COLCRYS) 0.6 MG Tab Take 1 Tablet by mouth every day. 30 Tablet 3    hydroCHLOROthiazide (HYDRODIURIL) 25 MG Tab Take 1 Tablet by mouth every day. 90 Tablet 3    latanoprost (XALATAN) 0.005 % Solution       diazePAM (VALIUM) 5 MG Tab Take 5 mg by mouth. (Patient not taking: Reported on 2024)      predniSONE (DELTASONE) 20 MG Tab Take 20 mg by mouth. (Patient not taking: Reported on 2024)      Ascorbic Acid (VITAMIN C) 500 MG Cap  (Patient not taking: Reported on 2024)       No current facility-administered medications for this visit.     He  has a past medical history of Thyroid disease.  He  has a past surgical history that includes hernia repair.  Social History     Tobacco Use    Smoking status: Never    Smokeless tobacco: Never   Vaping Use    Vaping Use: Never used   Substance Use Topics    Alcohol use: Yes     Alcohol/week: 1.2 - 2.4 oz     Types: 1 - 2 Shots of liquor, 1 - 2 Glasses of wine per week     Comment: every day    Drug use: No     Social History     Social History Narrative    Not on file     Family History   Problem Relation Age of Onset    Heart Attack Mother     Cancer Father         brain tumor    Stroke Maternal Grandmother 75    Heart Disease Paternal Grandmother     Heart Attack Paternal Grandfather      Family Status   Relation Name Status    Mo      Fa      Bro  Alive    MGMo      MGFa      PGMo      PGFa           ROS    The pertinent  ROS findings can be seen in the HPI above.     All other systems reviewed and are  "negative     Objective:     BP (!) 140/80 (BP Location: Right arm, Patient Position: Sitting, BP Cuff Size: Adult)   Pulse 92   Temp 36.4 °C (97.5 °F) (Temporal)   Ht 1.702 m (5' 7\")   Wt 74.8 kg (165 lb)   SpO2 98%  Body mass index is 25.84 kg/m².      Physical Exam:    Constitutional: Alert, no distress.  Skin: No suspicious lesions  Eye: Equal, round and reactive, conjunctiva clear, lids normal.  ENMT: Lips without lesions, good dentition, oropharynx clear.  Neck: Trachea midline, no masses, no thyromegaly. No cervical or supraclavicular lymphadenopathy.  Respiratory: Unlabored respiratory effort, lungs clear to auscultation, no wheezes, no ronchi.  Cardiovascular: Normal S1, S2, no murmur, no edema  Abdomen: Soft, non-tender, no masses, no hepatosplenomegaly.      MSK--tendor nodule felt directly over tendon over volar aspect of MCP joint on   Right thumb     Procedure Trigger Finger injection:    After appropriate verbal/written consent    The hand was placed flat with the palm up and the fingers outstretched. The injection is directed towards the point of maximal tenderness of the A1 pulley, which arises from the palmar aspect of the metacarpal head and metacarpophalangeal (MCP) joint. The injection site was prepped with  chlorhexidine. Ethyl chloride is sprayed on the skin for anesthesia. A 5/8-inch, 25-gauge needle was inserted to a depth of 1/4 to 3/8 inch for trigger finger.  The needle was positioned at a 45-degree angle to the skin with the needle tip directed proximally, and is advanced down to the firm resistance of the flexor tendon, i felt a rubbery sensation. The needle was backed up about 1 to 2 mm, and  1 mL of triamcinolone (10 mg/mL) mixed with a local anesthetic (such as 1/2 mL of lidocaine) is injected around the tendon sheath.             Assessment and Plan:   The following treatment plan was discussed    All recent labs and provider notes reviewed    1. Abnormal MRI  - Referral to " Urology    2. Trigger finger of right thumb      Patient tollerrated procedure well  There were no adverse events  Patient was given post procedure precautions   The injection may be repeated in six weeks if symptoms have not improved by at least 50 percent.      Other orders  - diazePAM (VALIUM) 5 MG Tab; Take 5 mg by mouth. (Patient not taking: Reported on 4/5/2024)  - predniSONE (DELTASONE) 20 MG Tab; Take 20 mg by mouth. (Patient not taking: Reported on 4/5/2024)  - artificial tears (EYE LUBRICANT) Ointment ophth ointment; Apply 1 Application to both eyes every 8 hours. Systane PF             Instructed to Follow up in clinic or ER for worsening symptoms, difficulty breathing, lack of expected recovery, or should new symptoms or problems arise.    Followup: Return in about 6 months (around 10/5/2024) for Reevaluation, labs.

## 2024-04-24 ENCOUNTER — TELEPHONE (OUTPATIENT)
Dept: MEDICAL GROUP | Age: 74
End: 2024-04-24
Payer: MEDICARE

## 2024-04-24 NOTE — TELEPHONE ENCOUNTER
Phone Number Called:[phone]    Call outcome: Left detailed message for patient. Informed to call back with any additional questions.    Message: LVM TO CALL BACK AFTER PATIENT LVM ABOUT SURGERY CLEARANCE

## 2024-05-28 ENCOUNTER — TELEPHONE (OUTPATIENT)
Dept: HEALTH INFORMATION MANAGEMENT | Facility: OTHER | Age: 74
End: 2024-05-28

## 2024-06-11 DIAGNOSIS — N40.1 BENIGN PROSTATIC HYPERPLASIA WITH NOCTURIA: ICD-10-CM

## 2024-06-11 DIAGNOSIS — R35.1 BENIGN PROSTATIC HYPERPLASIA WITH NOCTURIA: ICD-10-CM

## 2024-06-11 RX ORDER — TAMSULOSIN HYDROCHLORIDE 0.4 MG/1
CAPSULE ORAL
Qty: 90 CAPSULE | Refills: 3 | Status: SHIPPED | OUTPATIENT
Start: 2024-06-11

## 2024-06-13 DIAGNOSIS — I10 ESSENTIAL HYPERTENSION: ICD-10-CM

## 2024-06-13 RX ORDER — HYDROCHLOROTHIAZIDE 25 MG/1
25 TABLET ORAL DAILY
Qty: 90 TABLET | Refills: 3 | Status: SHIPPED | OUTPATIENT
Start: 2024-06-13

## 2024-06-30 DIAGNOSIS — Z00.00 PREVENTATIVE HEALTH CARE: ICD-10-CM

## 2024-07-01 NOTE — TELEPHONE ENCOUNTER
Pt scheduled.    Was the patient seen in the last year in this department? Yes     Does patient have an active prescription for medications requested? Yes     Received Request Via: Pharmacy

## 2024-07-02 RX ORDER — ASPIRIN 81 MG/1
TABLET, CHEWABLE ORAL
Qty: 90 TABLET | Refills: 3 | Status: SHIPPED | OUTPATIENT
Start: 2024-07-02

## 2024-09-03 DIAGNOSIS — E03.9 HYPOTHYROIDISM, UNSPECIFIED TYPE: ICD-10-CM

## 2024-09-04 RX ORDER — LEVOTHYROXINE SODIUM 125 UG/1
125 TABLET ORAL
Qty: 100 TABLET | Refills: 3 | Status: SHIPPED | OUTPATIENT
Start: 2024-09-04

## 2024-09-23 ENCOUNTER — NON-PROVIDER VISIT (OUTPATIENT)
Dept: MEDICAL GROUP | Age: 74
End: 2024-09-23
Payer: MEDICARE

## 2024-09-23 DIAGNOSIS — Z23 NEED FOR VACCINATION: ICD-10-CM

## 2024-09-23 PROCEDURE — 90662 IIV NO PRSV INCREASED AG IM: CPT | Performed by: FAMILY MEDICINE

## 2024-09-23 PROCEDURE — G0008 ADMIN INFLUENZA VIRUS VAC: HCPCS | Performed by: FAMILY MEDICINE

## 2024-09-23 NOTE — PROGRESS NOTES
"Dandre Estes is a 73 y.o. male here for a non-provider visit for:   FLU    Reason for immunization: Annual Flu Vaccine  Immunization records indicate need for vaccine: Yes, confirmed with Epic  Minimum interval has been met for this vaccine: Yes  ABN completed: Not Indicated    VIS Dated  n/a was given to patient: No  All IAC Questionnaire questions were answered \"No.\"    Patient tolerated injection and no adverse effects were observed or reported: Yes    Pt scheduled for next dose in series: Not Indicated  "

## 2024-10-20 ENCOUNTER — OFFICE VISIT (OUTPATIENT)
Dept: URGENT CARE | Facility: PHYSICIAN GROUP | Age: 74
End: 2024-10-20
Payer: MEDICARE

## 2024-10-20 ENCOUNTER — HOSPITAL ENCOUNTER (OUTPATIENT)
Dept: RADIOLOGY | Facility: MEDICAL CENTER | Age: 74
End: 2024-10-20
Payer: MEDICARE

## 2024-10-20 VITALS
DIASTOLIC BLOOD PRESSURE: 80 MMHG | HEART RATE: 97 BPM | OXYGEN SATURATION: 98 % | TEMPERATURE: 98 F | RESPIRATION RATE: 19 BRPM | BODY MASS INDEX: 24.91 KG/M2 | WEIGHT: 158.73 LBS | SYSTOLIC BLOOD PRESSURE: 132 MMHG | HEIGHT: 67 IN

## 2024-10-20 DIAGNOSIS — S39.012A ACUTE MYOFASCIAL STRAIN OF LUMBAR REGION, INITIAL ENCOUNTER: ICD-10-CM

## 2024-10-20 DIAGNOSIS — M54.50 ACUTE BILATERAL LOW BACK PAIN WITHOUT SCIATICA: ICD-10-CM

## 2024-10-20 PROCEDURE — 3079F DIAST BP 80-89 MM HG: CPT

## 2024-10-20 PROCEDURE — 72100 X-RAY EXAM L-S SPINE 2/3 VWS: CPT

## 2024-10-20 PROCEDURE — 3075F SYST BP GE 130 - 139MM HG: CPT

## 2024-10-20 PROCEDURE — 99213 OFFICE O/P EST LOW 20 MIN: CPT

## 2024-10-20 RX ORDER — CYCLOBENZAPRINE HCL 5 MG
5-10 TABLET ORAL
Qty: 15 TABLET | Refills: 0 | Status: SHIPPED | OUTPATIENT
Start: 2024-10-20

## 2024-10-20 ASSESSMENT — ENCOUNTER SYMPTOMS
DIARRHEA: 0
FALLS: 0
BOWEL INCONTINENCE: 0
WEAKNESS: 0
NUMBNESS: 0
PARESTHESIAS: 0
WEIGHT LOSS: 0
CONSTIPATION: 0
NECK PAIN: 0
PERIANAL NUMBNESS: 0
ABDOMINAL PAIN: 0
HEADACHES: 0
PARESIS: 0
BACK PAIN: 1
MYALGIAS: 0
TINGLING: 0
FOCAL WEAKNESS: 0
FEVER: 0
LEG PAIN: 0
LOSS OF CONSCIOUSNESS: 0

## 2024-10-20 ASSESSMENT — FIBROSIS 4 INDEX: FIB4 SCORE: 1.38

## 2024-11-14 DIAGNOSIS — E78.00 HYPERCHOLESTEREMIA: ICD-10-CM

## 2024-11-14 RX ORDER — SIMVASTATIN 80 MG
80 TABLET ORAL
Qty: 90 TABLET | Refills: 3 | Status: SHIPPED | OUTPATIENT
Start: 2024-11-14

## 2025-01-30 ENCOUNTER — HOSPITAL ENCOUNTER (OUTPATIENT)
Dept: LAB | Facility: MEDICAL CENTER | Age: 75
End: 2025-01-30
Attending: RADIOLOGY
Payer: MEDICARE

## 2025-01-30 LAB — PSA SERPL DL<=0.01 NG/ML-MCNC: 1.55 NG/ML (ref 0–4)

## 2025-01-30 PROCEDURE — 36415 COLL VENOUS BLD VENIPUNCTURE: CPT

## 2025-01-30 PROCEDURE — 84153 ASSAY OF PSA TOTAL: CPT

## 2025-02-11 ENCOUNTER — TELEPHONE (OUTPATIENT)
Dept: HEALTH INFORMATION MANAGEMENT | Facility: OTHER | Age: 75
End: 2025-02-11
Payer: MEDICARE

## 2025-02-11 DIAGNOSIS — E55.9 VITAMIN D DEFICIENCY: ICD-10-CM

## 2025-02-11 DIAGNOSIS — N40.0 BENIGN PROSTATIC HYPERPLASIA, UNSPECIFIED WHETHER LOWER URINARY TRACT SYMPTOMS PRESENT: ICD-10-CM

## 2025-02-11 DIAGNOSIS — E78.2 MIXED HYPERLIPIDEMIA: ICD-10-CM

## 2025-02-11 NOTE — TELEPHONE ENCOUNTER
1. Caller Name: Yovany Estes                          Call Back Number: [phone]        How would the patient prefer to be contacted with a response: Kristen barnes    Pt called to schedule appointment for a Physical Exam and would like to do his labs before his appointment with you on 03/05/2025.

## 2025-02-18 DIAGNOSIS — F51.01 PRIMARY INSOMNIA: ICD-10-CM

## 2025-02-18 NOTE — TELEPHONE ENCOUNTER
Received request via: Patient    Was the patient seen in the last year in this department? Yes    Does the patient have an active prescription (recently filled or refills available) for medication(s) requested? No    Ph[id number]  [place], [place] - [address] AT [address]     Does the patient have FDC Plus and need 100-day supply? (This applies to ALL medications) Yes, quantity updated to 100 days

## 2025-02-19 RX ORDER — TRAZODONE HYDROCHLORIDE 100 MG/1
100 TABLET ORAL EVERY EVENING
Qty: 100 TABLET | Refills: 2 | Status: SHIPPED | OUTPATIENT
Start: 2025-02-19

## 2025-03-03 ENCOUNTER — OFFICE VISIT (OUTPATIENT)
Dept: URGENT CARE | Facility: PHYSICIAN GROUP | Age: 75
End: 2025-03-03
Payer: MEDICARE

## 2025-03-03 VITALS
RESPIRATION RATE: 18 BRPM | SYSTOLIC BLOOD PRESSURE: 132 MMHG | HEIGHT: 67 IN | WEIGHT: 161.27 LBS | TEMPERATURE: 97.2 F | HEART RATE: 85 BPM | OXYGEN SATURATION: 97 % | BODY MASS INDEX: 25.31 KG/M2 | DIASTOLIC BLOOD PRESSURE: 82 MMHG

## 2025-03-03 DIAGNOSIS — J32.9 RHINOSINUSITIS: ICD-10-CM

## 2025-03-03 PROCEDURE — 99213 OFFICE O/P EST LOW 20 MIN: CPT | Performed by: STUDENT IN AN ORGANIZED HEALTH CARE EDUCATION/TRAINING PROGRAM

## 2025-03-03 PROCEDURE — 3075F SYST BP GE 130 - 139MM HG: CPT | Performed by: STUDENT IN AN ORGANIZED HEALTH CARE EDUCATION/TRAINING PROGRAM

## 2025-03-03 PROCEDURE — 3079F DIAST BP 80-89 MM HG: CPT | Performed by: STUDENT IN AN ORGANIZED HEALTH CARE EDUCATION/TRAINING PROGRAM

## 2025-03-03 RX ORDER — CETIRIZINE HYDROCHLORIDE 10 MG/1
10 TABLET ORAL DAILY
Qty: 30 TABLET | Refills: 1 | Status: SHIPPED | OUTPATIENT
Start: 2025-03-03

## 2025-03-03 ASSESSMENT — FIBROSIS 4 INDEX: FIB4 SCORE: 1.39659449751035124

## 2025-03-03 NOTE — PROGRESS NOTES
Subjective:   CHIEF COMPLAINT  Chief Complaint   Patient presents with    Cough     C/o cough, nasal congestion and drainage x2 weeks. Has been taking sudafed, cough syrup, and flonase.       HPI    History of Present Illness  Yovany Estes is a 74 y.o. male who presents for evaluation of a persistent cough and cold symptoms.    He began experiencing symptoms approximately 2 weeks ago, characterized by a heavy cough and cold-like symptoms, including significant nasal congestion. The cough is predominantly dry, with no identifiable triggers. He reports no recent fevers or flu-like symptoms such as body aches or chills, although he did experience chills about 1.5 weeks ago. He is not experiencing any chest pain, hemoptysis, or vomiting. He occasionally experiences wheezing during respiration but has no history of asthma or inhaler use. He does not report any sinus headaches, heartburn, or acid reflux.  The cough is sporadic, occurring both during the day and night, but is more pronounced at night. Despite attempts to alleviate the symptoms with over-the-counter cough syrups, Sudafed, and a nasal steroid.    SOCIAL HISTORY  He does not smoke.    MEDICATIONS  Current: Flonase        REVIEW OF SYSTEMS  General: no fever or chills  GI: no nausea or vomiting  See HPI for further details.    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    Slow transit constipation 11/30/2017    Lateral epicondylitis of left elbow 11/30/2017    Medicare annual wellness visit, initial 11/30/2017    Congenital hypothyroidism without goiter 03/01/2017    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    ALLERGIES  No Known Allergies    CURRENT MEDICATIONS  Home Medications       Reviewed by Ricardo Banegas Ass't (Medical Assistant) on 03/03/25 at 1304  Med List Status: <None>     Medication Last Dose Status   artificial tears (EYE LUBRICANT) Ointment ophth ointment Taking Active   Ascorbic Acid (VITAMIN C) 500 MG Cap Not Taking Flagged for Removal   aspirin (ASA) 81 MG Chew Tab chewable tablet Taking Active   colchicine (COLCRYS) 0.6 MG Tab PRN Active   cyclobenzaprine (FLEXERIL) 5 mg tablet PRN Active   diazePAM (VALIUM) 5 MG Tab Not Taking Flagged for Removal   fluticasone (FLONASE) 50 MCG/ACT nasal spray Taking Active   hydroCHLOROthiazide 25 MG Tab Taking Active   latanoprost (XALATAN) 0.005 % Solution Taking Active   levothyroxine (SYNTHROID) 125 MCG Tab Taking Active   predniSONE (DELTASONE) 20 MG Tab Not Taking Flagged for Removal   simvastatin (ZOCOR) 80 MG tablet Taking Active   tamsulosin (FLOMAX) 0.4 MG capsule Not Taking Flagged for Removal   traZODone (DESYREL) 100 MG Tab Taking Differently Active                    SOCIAL HISTORY  Social History     Tobacco Use    Smoking status: Never    Smokeless tobacco: Never   Vaping Use    Vaping status: Never Used   Substance and Sexual Activity    Alcohol use: Yes     Alcohol/week: 1.2 - 2.4 oz     Types: 1 - 2 Shots of  "liquor, 1 - 2 Glasses of wine per week     Comment: every day    Drug use: No    Sexual activity: Yes     Partners: Female       FAMILY HISTORY  Family History   Problem Relation Age of Onset    Heart Attack Mother     Cancer Father         brain tumor    Stroke Maternal Grandmother 75    Heart Disease Paternal Grandmother     Heart Attack Paternal Grandfather           Objective:   PHYSICAL EXAM  VITAL SIGNS: /82 (BP Location: Left arm, Patient Position: Sitting, BP Cuff Size: Adult long)   Pulse 85   Temp 36.2 °C (97.2 °F) (Temporal)   Resp 18   Ht 1.702 m (5' 7\") Comment: Pt reported  Wt 73.1 kg (161 lb 4.3 oz)   SpO2 97%   BMI 25.26 kg/m²     Gen: no acute distress, normal voice  Skin: dry, intact, moist mucosal membranes  Eyes: No conjunctival injection b/l  Neck: Normal range of motion. No meningeal signs.   ENT: No oropharyngeal erythema or exudates. Uvula midline. TMs clear and intact b/l w/o bulging, erythema or effusion. No lymphadenopathy.  Lungs: No increased work of breathing.  CTAB w/ symmetric expansion  CV: RRR w/o murmurs or clicks  Psych: normal affect, normal judgement, alert, awake    Assessment/Plan:     1. Rhinosinusitis  cetirizine (ZYRTEC) 10 MG Tab    amoxicillin-clavulanate (AUGMENTIN) 875-125 MG Tab      Patient with persistent sinus congestion and cough status post viral URI 2 weeks ago.  Afebrile without any systemic symptoms.  Lungs CTAB.  Suspect persistent cough secondary to PND.  He is not experiencing any sinus pain or headache and doubt there is a bacterial process at this point.  He was well-appearing, nontoxic and well-hydrated.  -Provided a NeilMed sinus rinse and encouraged nasal irrigation  -Continue Flonase  -Ordered Zyrtec  -Ordered Augmentin.  Begin antibiotics in 3 to 4 days only if still symptomatic despite above measures.  -Return to urgent care any new/worsening symptoms or further questions or concerns.  Patient understood everything discussed.  All " questions were answered.          Please note that this dictation was created using voice recognition software. I have made a reasonable attempt to correct obvious errors, but I expect that there are errors of grammar and possibly content that I did not discover before finalizing the note.

## 2025-03-05 ENCOUNTER — APPOINTMENT (OUTPATIENT)
Dept: MEDICAL GROUP | Age: 75
End: 2025-03-05
Payer: MEDICARE

## 2025-03-05 VITALS
TEMPERATURE: 96.3 F | OXYGEN SATURATION: 99 % | DIASTOLIC BLOOD PRESSURE: 80 MMHG | HEART RATE: 107 BPM | SYSTOLIC BLOOD PRESSURE: 124 MMHG | HEIGHT: 65 IN | BODY MASS INDEX: 26.16 KG/M2 | WEIGHT: 157 LBS

## 2025-03-05 DIAGNOSIS — E78.2 MIXED HYPERLIPIDEMIA: ICD-10-CM

## 2025-03-05 DIAGNOSIS — R13.11 ORAL PHASE DYSPHAGIA: ICD-10-CM

## 2025-03-05 DIAGNOSIS — E55.9 VITAMIN D DEFICIENCY: ICD-10-CM

## 2025-03-05 DIAGNOSIS — D12.5 ADENOMATOUS POLYP OF SIGMOID COLON: ICD-10-CM

## 2025-03-05 DIAGNOSIS — Z00.00 MEDICARE ANNUAL WELLNESS VISIT, INITIAL: ICD-10-CM

## 2025-03-05 DIAGNOSIS — H83.3X3 NOISE-INDUCED HEARING LOSS OF BOTH EARS: ICD-10-CM

## 2025-03-05 PROCEDURE — G0439 PPPS, SUBSEQ VISIT: HCPCS | Performed by: FAMILY MEDICINE

## 2025-03-05 PROCEDURE — 99214 OFFICE O/P EST MOD 30 MIN: CPT | Mod: 25 | Performed by: FAMILY MEDICINE

## 2025-03-05 PROCEDURE — 3079F DIAST BP 80-89 MM HG: CPT | Performed by: FAMILY MEDICINE

## 2025-03-05 PROCEDURE — 3074F SYST BP LT 130 MM HG: CPT | Performed by: FAMILY MEDICINE

## 2025-03-05 ASSESSMENT — PATIENT HEALTH QUESTIONNAIRE - PHQ9: CLINICAL INTERPRETATION OF PHQ2 SCORE: 0

## 2025-03-05 ASSESSMENT — ENCOUNTER SYMPTOMS: GENERAL WELL-BEING: GOOD

## 2025-03-05 ASSESSMENT — ACTIVITIES OF DAILY LIVING (ADL): BATHING_REQUIRES_ASSISTANCE: 0

## 2025-03-05 ASSESSMENT — FIBROSIS 4 INDEX: FIB4 SCORE: 1.39659449751035124

## 2025-03-05 NOTE — PROGRESS NOTES
HPI:    Yovany Estes is a 74 y.o. here today for a Medicare Annual Wellness Visit.       History of Present Illness  The patient is a 74-year-old male who presents for evaluation of prostate cancer, hearing loss, cold, and health maintenance. He is accompanied by his wife.    He underwent radiation therapy last summer following a diagnosis of prostate cancer. His most recent Prostate-Specific Antigen (PSA) level was 1.55, a significant decrease from the previous level of 6.11. His next appointment with the oncologist is scheduled for 08/2025, during which an updated PSA test will be conducted. A follow-up consultation with Dr. Kwan is planned for 02/2026. He reports experiencing pain every 10 minutes post-radiation, along with increased urinary frequency and urgency.    He has been experiencing hearing difficulties, particularly when attempting to listen to two simultaneous conversations or when exposed to loud noises.    He has been battling a cold for the past 2 weeks, for which he sought treatment at an urgent care facility on Monday. He was prescribed antibiotics and NeilMed for sinus clearance. He has been sleeping well over the past few nights.    He also reports that after consuming a few bites of food, he experiences throat tension and coughing, which he attributes to potential irritation from his medications. He does not report any difficulty swallowing.    His last colonoscopy was performed in 2013, and he was advised to undergo another one in 3 years. He had polyps removed during his last colonoscopy. He maintains an active lifestyle, engaging in regular walks and household chores.            Patient Active Problem List    Diagnosis Date Noted    Oral phase dysphagia 03/05/2025    Abnormal MRI 04/05/2024    Trigger finger of right thumb 04/05/2024    Benign prostatic hyperplasia with nocturia 02/14/2024    Primary insomnia 10/19/2023    Seasonal allergies 10/19/2023    Hypogonadism  in male 10/19/2023    Viral upper respiratory tract infection 05/21/2021    Discomfort of both ears 01/24/2020    CVA (cerebral vascular accident) (HCC) 10/24/2019    Elevated PSA 02/06/2018    Visit for suture removal 01/05/2018    Benign prostatic hyperplasia without lower urinary tract symptoms 01/04/2018    Neoplasm of uncertain behavior 12/20/2017    Slow transit constipation 11/30/2017    Lateral epicondylitis of left elbow 11/30/2017    Medicare annual wellness visit, initial 11/30/2017    Congenital hypothyroidism without goiter 03/01/2017    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         Allergies: Patient has no known allergies.         Current social contact/activities: walking 20-30 minutes 3x a week and work on yard          He  reports that he has never smoked. He has never used smokeless tobacco. He reports current alcohol use of about 12.0 oz of alcohol per week. He reports that he does not use drugs.         Lives with at home: wife    Any caregivers: No    Is the caregiver paid or unpaid: no         ROS:     Gait: Uses no assistive device    Ostomy: No    Other tubes: No    Amputations: No    Chronic oxygen use: No    Last eye exam: 10/2024    Wears hearing aids: No    : Denies any urinary leakage during the last 6 months         Depression Screening  Little interest or pleasure in doing things?  0 - not at all  Feeling down, depressed , or hopeless? 0 - not at all  Trouble falling or staying asleep, or sleeping too much?     Feeling tired or having little energy?     Poor appetite or overeating?     Feeling bad about yourself - or that you are  a failure or have let yourself or your family down?    Trouble concentrating on things, such as reading the newspaper or watching television?    Moving or speaking so slowly that other people could have noticed.  Or the opposite - being so fidgety or restless that you have been moving around a lot more than usual?     Thoughts that you would be better off dead, or of hurting yourself?     Patient Health Questionnaire Score:      If depressive symptoms identified deferred to follow up visit unless specifically addressed in assessment and plan.    Interpretation of PHQ-9 Total Score   Score Severity   1-4 No Depression   5-9 Mild Depression   10-14 Moderate Depression   15-19 Moderately Severe Depression   20-27 Severe Depression    Screening for Cognitive Impairment  Do you or any of your friends or family members have any concern about your memory? Yes  Three Minute Recall (Village, Kitchen, Baby) 3/3    Servando clock face with all 12 numbers and set the hands to show 10 minutes past 11.  Yes Minute hand was placed on the 10 instead 2   Cognitive concerns identified deferred for follow up unless specifically addressed in assessment and plan.    Fall Risk Assessment  Has the patient had two or more falls in the last year or any fall with injury in the last year?  No    Safety Assessment  Do you always wear your seatbelt?  Yes  Any changes to home needed to function safely? No  Difficulty hearing.  Yes  Patient counseled about all safety risks that were identified.    Functional Assessment ADLs  Are there any barriers preventing you from cooking for yourself or meeting nutritional needs?  No.    Are there any barriers preventing you from driving safely or obtaining transportation?  No.    Are there any barriers preventing you from using a telephone or calling for help?  No    Are there any barriers preventing you from shopping?  No.    Are there any barriers preventing you from taking care of your own finances?  No     Are there any barriers preventing you from managing your medications?  No    Are there any barriers preventing you from showering, bathing or dressing yourself? No    Are there any barriers preventing you from doing housework or laundry? No    Are there any barriers preventing you from using the toilet?No    Are you currently engaging in any exercise or physical activity?  Yes.      Self-Assessment of Health  What is your perception of your health? Good    Do you sleep more than six hours a night? Yes    In the past 7 days, how much did pain keep you from doing your normal work? None    Do you spend quality time with family or friends (virtually or in person)? Yes    Do you usually eat a heart healthy diet that constists of a variety of fruits, vegetables, whole grains and fiber? Yes    Do you eat foods high in fat and/or Fast Food more than three times per week? No    How concerned are you that your medical conditions are not being well managed? Not at all    Are you worried that in the next 2 months, you may not have stable housing that you own, rent, or stay in as part of a household? No        Advance Care Planning  Do you have an Advance Directive, Living Will, Durable Power of , or POLST? Yes  Advance Directive Living Will Durable Power of    is on file      Health Maintenance Summary            Current Care Gaps       COVID-19 Vaccine (2024- season) Overdue since 2024      12/10/2021  Imm Admin: PFIZER PURPLE CAP SARS-COV-2 VACCINATION (12+)    2021  Imm Admin: PFIZER PURPLE CAP SARS-COV-2 VACCINATION (12+)    2021  Imm Admin: PFIZER PURPLE CAP SARS-COV-2 VACCINATION (12+)                      Needs Review       Hepatitis C Screening  Tentatively Complete      02/10/2020  Hepatitis C Antibody component of HCV Scrn ( 1612-5291 [facility])                      Upcoming       IMM DTaP/Tdap/Td Vaccine (2 - Td or Tdap) Next due on 2015  Imm Admin: Tdap  Vaccine              Annual Wellness Visit (Yearly) Next due on 3/5/2026      03/05/2025  Level of Service: [place] ANNUAL WELLNESS VISIT-INCLUDES PPPS SUBSEQUE*    03/05/2025  Visit Dx: Medicare annual wellness visit, initial    03/05/2025  Done    11/30/2017  Visit Dx: Medicare annual wellness visit, initial    11/30/2017  Prob Dx: Medicare annual wellness visit, initial     Only the first 5 history entries have been loaded, but more history exists.                    Completed or No Longer Recommended       Influenza Vaccine (Series Information) Completed      09/23/2024  Imm Admin: Influenza high-dose trivalent (PF)    10/19/2023  Imm Admin: Influenza Vaccine Adult HD    11/10/2022  Imm Admin: Influenza Vaccine Quad Inj (Pf)              Zoster (Shingles) Vaccines (Series Information) Completed      07/14/2021  Imm Admin: Zoster Vaccine Recombinant (RZV) (SHINGRIX)    03/17/2021  Imm Admin: Zoster Vaccine Recombinant (RZV) (SHINGRIX)              Pneumococcal Vaccine: 50+ Years (Series Information) Completed      08/18/2016  Imm Admin: Pneumococcal polysaccharide vaccine (PPSV-23)    04/29/2015  Imm Admin: Pneumococcal Conjugate Vaccine (Prevnar/PCV-13)              Hepatitis A Vaccine (Hep A) (Series Information) Aged Out      No completion history exists for this topic.              Hepatitis B Vaccine (Hep B) (Series Information) Aged Out     No completion history exists for this topic.              HPV Vaccines (Series Information) Aged Out     No completion history exists for this topic.              Polio Vaccine (Inactivated Polio) (Series Information) Aged Out     No completion history exists for this topic.              Meningococcal Immunization (Series Information) Aged Out     No completion history exists for this topic.              Colorectal Cancer Screening  Discontinued        Frequency changed to Never automatically (Topic No Longer Applies)    04/27/2023  Colon Cancer Screening Cologuard Stool (FIT  DNA) (Done - due in 3 years)    03/23/2013  Colonoscopy (Previously completed - was told clear for 10 more years.)                            Patient Care Team:  Harry Morel M.D. as PCP - General (Family Medicine)  Romel Díaz M.D. as Consulting Physician (Ophthalmology)  MARISOL Messina.-C. (Physician Assistant)           Patient Care Team:  Harry Morel M.D. as PCP - General (Family Medicine)  Romel Díaz M.D. as Consulting Physician (Ophthalmology)  ERICKA MessinaA.-C. (Physician Assistant)         Social History     Tobacco Use    Smoking status: Never    Smokeless tobacco: Never   Vaping Use    Vaping status: Never Used   Substance Use Topics    Alcohol use: Yes     Alcohol/week: 12.0 oz     Types: 14 Glasses of wine, 6 Shots of liquor per week     Comment: every day    Drug use: No            Family History   Problem Relation Age of Onset    Heart Attack Mother     Cancer Father         brain tumor    Stroke Maternal Grandmother 75    Heart Disease Paternal Grandmother     Heart Attack Paternal Grandfather             Past Surgical History:   Procedure Laterality Date    HERNIA REPAIR              Whisper test - stand 3 feet  behind patient and whisper 3 numbers for each ear while covering other ear..    - Right 3/3, Left 3/3         Vision test - using Snellen eye chart test both eyes separately and together, with and without correction.     - Without correction:      OD  20/25, OS 20/25, OU 20/25    - With correction:             OD  20/50, OS 20/15, OU 20/13         Rise and walk test - Have patient stand, walk 10 feet and return to chair.    -  10 seconds         Stay independent checklist:    Yes (2) No (0) I have fallen in the past year. 0    Yes (2) No (0) I use or have been advised to use a cane or    walker to get around safely.0    Yes (1) No (0) Sometimes I feel unsteady when I am walking. 0    Yes (1) No (0) I steady myself by holding onto furniture    when  "walking at home.1    Yes (1) No (0) I am worried about falling 1    Yes (1) No (0) I need to push with my hands to stand up    from a chair.0    Yes (1) No (0) I have some trouble stepping up onto a curb. 0    Yes (1) No (0) I often have to rush to the toilet. 0    Yes (1) No (0) I have lost some feeling in my feet. 0    Yes (1) No (0) I take medicine that sometimes makes me feel    light-headed or more tired than usual.0    Yes (1) No (0) I take medicine to help me sleep or improve    my mood. 1    Yes (1) No (0) I often feel sad or depressed. 1    Total Add up the number of points for each “yes” answer. 4         EXAM    /80 (BP Location: Left arm, Patient Position: Sitting, BP Cuff Size: Adult)   Pulse (!) 107   Temp (!) 35.7 °C (96.3 °F) (Temporal)   Ht 1.656 m (5' 5.2\")   Wt 71.2 kg (157 lb)   SpO2 99%  -          Hearing good.     Dentition good    Alert, oriented in no acute distress.    Eye contact is good, speech goal directed, affect calm         Health maintenance review: (catch them up on all care gaps - vaccines, mammogram, colon cancer screen)    Health Maintenance Summary            Current Care Gaps       COVID-19 Vaccine (2024- season) Overdue since 2024      12/10/2021  Imm Admin: PFIZER PURPLE CAP SARS-COV-2 VACCINATION (12+)    2021  Imm Admin: PFIZER PURPLE CAP SARS-COV-2 VACCINATION (12+)    2021  Imm Admin: PFIZER PURPLE CAP SARS-COV-2 VACCINATION (12+)                      Needs Review       Hepatitis C Screening  Tentatively Complete      02/10/2020  Hepatitis C Antibody component of HCV Scrn ( [id number] [facility])                      Upcoming       IMM DTaP/Tdap/Td Vaccine (2 - Td or Tdap) Next due on 2015  Imm Admin: Tdap Vaccine              Annual Wellness Visit (Yearly) Next due on 3/5/2026      2025  Level of Service: [place] ANNUAL WELLNESS VISIT-INCLUDES PPPS SUBSEQUE*    2025  Visit Dx: Medicare annual wellness visit, " initial    03/05/2025  Done    11/30/2017  Visit Dx: Medicare annual wellness visit, initial    11/30/2017  Prob Dx: Medicare annual wellness visit, initial     Only the first 5 history entries have been loaded, but more history exists.                    Completed or No Longer Recommended       Influenza Vaccine (Series Information) Completed      09/23/2024  Imm Admin: Influenza high-dose trivalent (PF)    10/19/2023  Imm Admin: Influenza Vaccine Adult HD    11/10/2022  Imm Admin: Influenza Vaccine Quad Inj (Pf)              Zoster (Shingles) Vaccines (Series Information) Completed      07/14/2021  Imm Admin: Zoster Vaccine Recombinant (RZV) (SHINGRIX)    03/17/2021  Imm Admin: Zoster Vaccine Recombinant (RZV) (SHINGRIX)              Pneumococcal Vaccine: 50+ Years (Series Information) Completed      08/18/2016  Imm Admin: Pneumococcal polysaccharide vaccine (PPSV-23)    04/29/2015  Imm Admin: Pneumococcal Conjugate Vaccine (Prevnar/PCV-13)              Hepatitis A Vaccine (Hep A) (Series Information) Aged Out      No completion history exists for this topic.              Hepatitis B Vaccine (Hep B) (Series Information) Aged Out     No completion history exists for this topic.              HPV Vaccines (Series Information) Aged Out     No completion history exists for this topic.              Polio Vaccine (Inactivated Polio) (Series Information) Aged Out     No completion history exists for this topic.              Meningococcal Immunization (Series Information) Aged Out     No completion history exists for this topic.              Colorectal Cancer Screening  Discontinued        Frequency changed to Never automatically (Topic No Longer Applies)    04/27/2023  Colon Cancer Screening Cologuard Stool (FIT DNA) (Done - due in 3 years)    03/23/2013  Colonoscopy (Previously completed - was told clear for 10 more years.)                                 Medication review:    Current Outpatient Medications   Medication  Sig Dispense Refill    cetirizine (ZYRTEC) 10 MG Tab Take 1 Tablet by mouth every day. 30 Tablet 1    amoxicillin-clavulanate (AUGMENTIN) 875-125 MG Tab Take 1 Tablet by mouth 2 times a day for 5 days. 10 Tablet 0    traZODone (DESYREL) 100 MG Tab Take 1 Tablet by mouth every evening. (Patient taking differently: Take 50 mg by mouth every evening.) 100 Tablet 2    simvastatin (ZOCOR) 80 MG tablet Take 1 Tablet by mouth at bedtime. 90 Tablet 3    cyclobenzaprine (FLEXERIL) 5 mg tablet Take 1-2 Tablets by mouth at bedtime as needed for Muscle Spasms. 15 Tablet 0    levothyroxine (SYNTHROID) 125 MCG Tab TAKE ONE TABLET BY MOUTH EVERY MORNING ON AN EMPTY STOMACH 100 Tablet 3    aspirin (ASA) 81 MG Chew Tab chewable tablet CHEW ONE TABLET BY MOUTH DAILY WITH FOOD 90 Tablet 3    hydroCHLOROthiazide 25 MG Tab TAKE 1 TABLET BY MOUTH DAILY 90 Tablet 3    tamsulosin (FLOMAX) 0.4 MG capsule TAKE 1 CAPSULE BY MOUTH DAILY 30 MINUTES AFTER BREAKFAST 90 Capsule 3    predniSONE (DELTASONE) 20 MG Tab Take 20 mg by mouth.      artificial tears (EYE LUBRICANT) Ointment ophth ointment Apply 1 Application to both eyes every 8 hours. Systane PF      Ascorbic Acid (VITAMIN C) 500 MG Cap       fluticasone (FLONASE) 50 MCG/ACT nasal spray Administer 1 Spray into affected nostril(S) every day. 16 g 0    colchicine (COLCRYS) 0.6 MG Tab Take 1 Tablet by mouth every day. 30 Tablet 3    latanoprost (XALATAN) 0.005 % Solution        No current facility-administered medications for this visit.            If opioid/benzo/sedative on med list discussion of reduction/elimination - referral         At this point document if pt refuses to consider changes off of controlled substance          Vital sign review - unintentional >10 pound weight loss evaluation (BMI and Muscle Mass review)          Whisper test fail - audiology referral          Eye test fail - ophthalmology referral          Stay independent checklist - PT referral for a score of 4 or  more          PHQ evaluation - consider visit for medication change/referral          Mini-cog evaluation - consider visit for further evaluation/referral          POLST on file- if not need to have on hand for patient to fill out - consider Palliative referral etc.         FLORINA screen -         Audit-C Questionnaire         1. How often did you have a drink containing alcohol in the past year?     Never (0 points)  * If you answered Never, score questions 2 and 3 below as zero.      Monthly or less (1 point)     2 to 4 times a month (2 points)     2 or 3 times per week (3 points)     4 or more times a week (4 points)         4         2. How many drinks did you have on a typical day when you were drinking in the past year?     1 - 2 (0 points)     3 - 4 (1point)     5 - 6 (2 points)     7 - 9 (3 points)     10 or more (4 points)         0         3. How often did you have 6 or more drinks on one occasion in the past year?     Never (0 points)     Less than monthly (1 point)     Monthly (2 points)     Weekly (3 points)     Daily or almost daily (4 points)         1         Total: 5         Scorin or more for men and 3 or more for women is an indication for further evaluation for hazardous drinking.         Chronic pain screen:              PEG: A Three-Item Scale Assessing Pain Intensity and Interference    1. What number best describes your pain on average in the past week?     0       1         2              3                     4                          5              6               7             8           9            10    No pain                                                                                                            Pain as bad as you can imagine         0         2. What number best describes how, during the past week, pain has interfered    with your enjoyment of life?    0       1         2              3                     4                          5              6              "  7             8           9            10    No pain                                                                                                            Pain as bad as you can imagine         00    3. What number best describes how, during the past week, pain has interfered    with your general activity?     0       1         2              3                     4                          5              6               7             8           9            10    No pain                                                                                                            Pain as bad as you can imagine         0         Scoring: average of 3 scales: 0    Serves as a baseline measurement for chronic pain issues and quality of life and can be used to prompt pain clinic referral.                   Tobacco use - consider visit for discussion if user - discuss alternatives and quitting         Food insecurity screen - (The Hunger Vital Sign)    Within the past 12 months were you worried whether food would run out before you got money to buy more?    Often true          sometimes true          never true    NEVER         Within the past 12 months did the food you bought not last and you did not have money to buy more food?    Often true         sometimes  true          never true    NEVER         Scoring: answering \"often true\" or \"sometimes true\" to either question is a positive screen for food insecurity and should prompt a social work/ referral if needed         Review of Rise and walk test - PT referral for a score over 13 seconds         Clasp arms behind back and head test - have the patient try and clasp hands behind back and then over head, failure to accomplish may indicate shoulder issues- PT/ortho/pmr referral               Assessment and Plan. The following treatment and monitoring plan is recommended:                  Services suggested: No services needed at this time    Health Care " Screening: Age-appropriate preventive services recommended by USPTF and ACIP covered by Medicare were discussed today. Services ordered if indicated and agreed upon by the patient.    Referrals offered: Community-based lifestyle interventions to reduce health risks and promote self-management and wellness, fall prevention, nutrition, physical activity, tobacco-use cessation, weight loss, and mental health services as per orders if indicated.         Discussion today about general wellness and lifestyle habits:                 Prevent falls and reduce trip hazards; Cautioned about securing or removing rugs.                Have a working fire alarm and carbon monoxide detector;                Engage in regular physical activity and social activities         1. Medicare annual wellness visit, initial    Completed    2. Mixed hyperlipidemia  - CBC WITH DIFFERENTIAL; Future  - Comp Metabolic Panel; Future  - Lipid Profile; Future  - TSH+FREE T4  - T3 FREE; Future    3. 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 Adenomatous polyp of sigmoid colon  - Referral to Gastroenterology    6. Oral phase dysphagia  - Referral to Gastroenterology         Assessment & Plan  1. Prostate cancer.  His PSA levels have shown a significant decrease from 6.11 to 1.55, indicating a positive response to the radiation therapy received last summer. He is advised to continue his follow-up appointments with his oncologist, with the next PSA test scheduled for August and a follow-up with Dr. Kwan in February.    2. Hearing loss.  He reports difficulty hearing conversations when there is background noise and increased sensitivity to loud noises. A referral to audiology has been initiated for further evaluation.    3. Dysphagia.  He experiences difficulty swallowing and coughing after the first few bites of food, which may indicate aspiration or  reflux. A referral to gastroenterology has been made for an endoscopy to investigate the cause.    4. Cold.  He has been fighting a cold for two weeks and was prescribed an antibiotic and NeilMed for sinus clearance at urgent care. He is advised to use the entire bottle of NeilMed at once, divided between both nostrils. He can take cough syrup if the cough becomes unbearable but is reminded that coughing is a natural mechanism to expel infection.    5. Health maintenance.  A referral to gastroenterology has been made for a colonoscopy, as his last one was in 2013, and he had polyps. He is encouraged to maintain an active lifestyle through regular exercise.    Follow-up  The patient will follow up in 6 months.    PROCEDURE  Colonoscopy in 2013 with polyp removal.        Follow-up: Return in about 6 months (around 9/5/2025) for Reevaluation, labs.

## 2025-03-20 ENCOUNTER — HOSPITAL ENCOUNTER (OUTPATIENT)
Dept: LAB | Facility: MEDICAL CENTER | Age: 75
End: 2025-03-20
Attending: FAMILY MEDICINE
Payer: MEDICARE

## 2025-03-20 DIAGNOSIS — E55.9 VITAMIN D DEFICIENCY: ICD-10-CM

## 2025-03-20 DIAGNOSIS — E78.2 MIXED HYPERLIPIDEMIA: ICD-10-CM

## 2025-03-20 LAB
25(OH)D3 SERPL-MCNC: 26 NG/ML (ref 30–100)
ALBUMIN SERPL BCP-MCNC: 4 G/DL (ref 3.2–4.9)
ALBUMIN/GLOB SERPL: 1.5 G/DL
ALP SERPL-CCNC: 56 U/L (ref 30–99)
ALT SERPL-CCNC: 25 U/L (ref 2–50)
ANION GAP SERPL CALC-SCNC: 13 MMOL/L (ref 7–16)
AST SERPL-CCNC: 28 U/L (ref 12–45)
BASOPHILS # BLD AUTO: 0.6 % (ref 0–1.8)
BASOPHILS # BLD: 0.03 K/UL (ref 0–0.12)
BILIRUB SERPL-MCNC: 0.7 MG/DL (ref 0.1–1.5)
BUN SERPL-MCNC: 15 MG/DL (ref 8–22)
CALCIUM ALBUM COR SERPL-MCNC: 9.4 MG/DL (ref 8.5–10.5)
CALCIUM SERPL-MCNC: 9.4 MG/DL (ref 8.5–10.5)
CHLORIDE SERPL-SCNC: 101 MMOL/L (ref 96–112)
CHOLEST SERPL-MCNC: 157 MG/DL (ref 100–199)
CO2 SERPL-SCNC: 25 MMOL/L (ref 20–33)
CREAT SERPL-MCNC: 0.89 MG/DL (ref 0.5–1.4)
EOSINOPHIL # BLD AUTO: 0.25 K/UL (ref 0–0.51)
EOSINOPHIL NFR BLD: 5.2 % (ref 0–6.9)
ERYTHROCYTE [DISTWIDTH] IN BLOOD BY AUTOMATED COUNT: 42.3 FL (ref 35.9–50)
FASTING STATUS PATIENT QL REPORTED: NORMAL
GFR SERPLBLD CREATININE-BSD FMLA CKD-EPI: 89 ML/MIN/1.73 M 2
GLOBULIN SER CALC-MCNC: 2.7 G/DL (ref 1.9–3.5)
GLUCOSE SERPL-MCNC: 104 MG/DL (ref 65–99)
HCT VFR BLD AUTO: 43.4 % (ref 42–52)
HDLC SERPL-MCNC: 60 MG/DL
HGB BLD-MCNC: 14.4 G/DL (ref 14–18)
IMM GRANULOCYTES # BLD AUTO: 0.02 K/UL (ref 0–0.11)
IMM GRANULOCYTES NFR BLD AUTO: 0.4 % (ref 0–0.9)
LDLC SERPL CALC-MCNC: 81 MG/DL
LYMPHOCYTES # BLD AUTO: 0.85 K/UL (ref 1–4.8)
LYMPHOCYTES NFR BLD: 17.8 % (ref 22–41)
MCH RBC QN AUTO: 31.9 PG (ref 27–33)
MCHC RBC AUTO-ENTMCNC: 33.2 G/DL (ref 32.3–36.5)
MCV RBC AUTO: 96 FL (ref 81.4–97.8)
MONOCYTES # BLD AUTO: 0.81 K/UL (ref 0–0.85)
MONOCYTES NFR BLD AUTO: 17 % (ref 0–13.4)
NEUTROPHILS # BLD AUTO: 2.81 K/UL (ref 1.82–7.42)
NEUTROPHILS NFR BLD: 59 % (ref 44–72)
NRBC # BLD AUTO: 0 K/UL
NRBC BLD-RTO: 0 /100 WBC (ref 0–0.2)
PLATELET # BLD AUTO: 230 K/UL (ref 164–446)
PMV BLD AUTO: 9.3 FL (ref 9–12.9)
POTASSIUM SERPL-SCNC: 4 MMOL/L (ref 3.6–5.5)
PROT SERPL-MCNC: 6.7 G/DL (ref 6–8.2)
RBC # BLD AUTO: 4.52 M/UL (ref 4.7–6.1)
SODIUM SERPL-SCNC: 139 MMOL/L (ref 135–145)
T3FREE SERPL-MCNC: 3.42 PG/ML (ref 2–4.4)
T4 FREE SERPL-MCNC: 2.1 NG/DL (ref 0.93–1.7)
TRIGL SERPL-MCNC: 78 MG/DL (ref 0–149)
TSH SERPL-ACNC: 0.02 UIU/ML (ref 0.35–5.5)
WBC # BLD AUTO: 4.8 K/UL (ref 4.8–10.8)

## 2025-03-20 PROCEDURE — 84439 ASSAY OF FREE THYROXINE: CPT

## 2025-03-20 PROCEDURE — 36415 COLL VENOUS BLD VENIPUNCTURE: CPT

## 2025-03-20 PROCEDURE — 84443 ASSAY THYROID STIM HORMONE: CPT

## 2025-03-20 PROCEDURE — 85025 COMPLETE CBC W/AUTO DIFF WBC: CPT

## 2025-03-20 PROCEDURE — 82306 VITAMIN D 25 HYDROXY: CPT

## 2025-03-20 PROCEDURE — 84481 FREE ASSAY (FT-3): CPT

## 2025-03-20 PROCEDURE — 80053 COMPREHEN METABOLIC PANEL: CPT

## 2025-03-20 PROCEDURE — 80061 LIPID PANEL: CPT

## 2025-03-21 ENCOUNTER — RESULTS FOLLOW-UP (OUTPATIENT)
Dept: MEDICAL GROUP | Age: 75
End: 2025-03-21

## 2025-04-04 ENCOUNTER — APPOINTMENT (OUTPATIENT)
Dept: MEDICAL GROUP | Age: 75
End: 2025-04-04
Payer: MEDICARE

## 2025-04-04 VITALS
HEIGHT: 65 IN | RESPIRATION RATE: 16 BRPM | OXYGEN SATURATION: 95 % | BODY MASS INDEX: 26.79 KG/M2 | WEIGHT: 160.8 LBS | TEMPERATURE: 97.5 F | SYSTOLIC BLOOD PRESSURE: 126 MMHG | DIASTOLIC BLOOD PRESSURE: 62 MMHG | HEART RATE: 80 BPM

## 2025-04-04 DIAGNOSIS — E03.9 HYPOTHYROIDISM, UNSPECIFIED TYPE: ICD-10-CM

## 2025-04-04 DIAGNOSIS — M65.311 TRIGGER FINGER OF RIGHT THUMB: ICD-10-CM

## 2025-04-04 PROCEDURE — 3078F DIAST BP <80 MM HG: CPT | Performed by: FAMILY MEDICINE

## 2025-04-04 PROCEDURE — 99214 OFFICE O/P EST MOD 30 MIN: CPT | Mod: 25 | Performed by: FAMILY MEDICINE

## 2025-04-04 PROCEDURE — 20550 NJX 1 TENDON SHEATH/LIGAMENT: CPT | Mod: RT | Performed by: FAMILY MEDICINE

## 2025-04-04 PROCEDURE — 3074F SYST BP LT 130 MM HG: CPT | Performed by: FAMILY MEDICINE

## 2025-04-04 RX ORDER — TRIAMCINOLONE ACETONIDE 40 MG/ML
40 INJECTION, SUSPENSION INTRA-ARTICULAR; INTRAMUSCULAR ONCE
Status: COMPLETED | OUTPATIENT
Start: 2025-04-04 | End: 2025-04-04

## 2025-04-04 RX ORDER — LEVOTHYROXINE SODIUM 112 UG/1
112 TABLET ORAL
Qty: 100 TABLET | Refills: 2 | Status: SHIPPED | OUTPATIENT
Start: 2025-04-04

## 2025-04-04 RX ADMIN — TRIAMCINOLONE ACETONIDE 40 MG: 40 INJECTION, SUSPENSION INTRA-ARTICULAR; INTRAMUSCULAR at 09:17

## 2025-04-04 RX ADMIN — TRIAMCINOLONE ACETONIDE 40 MG: 40 INJECTION, SUSPENSION INTRA-ARTICULAR; INTRAMUSCULAR at 09:16

## 2025-04-04 ASSESSMENT — FIBROSIS 4 INDEX: FIB4 SCORE: 1.85

## 2025-04-04 NOTE — PROGRESS NOTES
This medical record contains text that has been entered with the assistance of computer voice recognition and dictation software.  Therefore, it may contain unintended errors in text, spelling, punctuation, or grammar      Verbal consent was acquired by the patient to use [employer] listening note generation during this visit Yes       Chief Complaint   Patient presents with    Lab Results    Other     Trigger finger         Yovany Estes is a 74 y.o. male here evaluation and management of: trigger finger      HPI:     1. Trigger finger of right thumb    - Triamcinolone Acetonide  - Triamcinolone Acetonide    2. Hypothyroidism, unspecified type        History of Present Illness  The patient, a 74-year-old male, presents for evaluation of stenosing tenosynovitis, presbycusis, insomnia, hypothyroidism, and hypovitaminosis D.    Trigger finger of right thumb  He reports a recurrence of stenosing tenosynovitis, which was noted during his last visit approximately one month ago. He did not receive an injection at that time due to his preference to maintain hand functionality for the remainder of the day. The condition has since exacerbated, causing irritation in the lower joint of the affected finger, which becomes locked at the metacarpophalangeal joint, resulting in pain. He expresses a desire to receive an injection today.  - Onset: Recurrence noted during last visit approximately one month ago.  - Location: Lower joint of the affected finger.  - Character: Irritation and locking at the metacarpophalangeal joint, resulting in pain.  - Alleviating/Aggravating Factors: Preference to maintain hand functionality; exacerbation of condition.  - Severity: Painful locking of the finger.    Presbycusis  He has undergone audiometric testing, which confirmed difficulty in processing multiple simultaneous conversations. He is considering the use of hearing aids but wishes to prioritize a gastroenterology  evaluation first. He has a scheduled appointment in 2025 and is currently on the waitlist.  - Character: Difficulty in processing multiple simultaneous conversations.  - Timing: Scheduled appointment in 2025; currently on the waitlist.    Insomnia  He occasionally takes trazodone at 1:30 AM but experiences awakenings at 4:00 AM. He is contemplating increasing the dosage to a full tablet.  - Onset: Occasional use of trazodone at 1:30 AM.  - Timing: Awakenings at 4:00 AM.    Physical Activity and Associated Symptoms  He engages in regular physical activity, including walking up a steep street in [place], which takes him 20 minutes to ascend, followed by a 35 to 40-minute walk around the neighborhood. He experiences dyspnea and tachycardia during these walks.  - Character: Dyspnea and tachycardia during walks.  - Timin minutes to ascend steep street; 35 to 40-minute walk around the neighborhood.    Hypothyroidism  He has been diagnosed with hypothyroidism and is currently on levothyroxine 125 mcg. He is questioning whether his thyroid condition could be causing throat tension, which leads to coughing.  - Character: Throat tension leading to coughing.  - Alleviating/Aggravating Factors: Currently on levothyroxine 125 mcg.    Hypovitaminosis D  His vitamin D levels are low, and he is considering supplementation. He speculates that the deficiency may be due to reduced sun exposure during the winter months.  - Character: Low vitamin D levels.  - Alleviating/Aggravating Factors: Considering supplementation; reduced sun exposure during winter months.    Elevated Monocyte Count  His monocyte count is elevated.  - Character: Elevated monocyte count.    Elevated Glucose Levels  His glucose levels are elevated, which he attributes to his dietary habits, specifically the consumption of bread with honey for breakfast.  - Character: Elevated glucose levels.  - Alleviating/Aggravating Factors: Dietary habits,  specifically consumption of bread with honey for breakfast.    MEDICATIONS  Current: trazodone, levothyroxine           Results  Laboratory Studies        Latest Reference Range & Units 03/20/25 07:33   TSH 0.350 - 5.500 uIU/mL 0.024 (L)   Free T-4 0.93 - 1.70 ng/dL 2.10 (H)   T3,Free 2.00 - 4.40 pg/mL 3.42   (L): Data is abnormally low  (H): Data is abnormally high    Current medicines (including changes today)  Current Outpatient Medications   Medication Sig Dispense Refill    levothyroxine (SYNTHROID) 112 MCG Tab Take 1 Tablet by mouth every morning on an empty stomach. 100 Tablet 2    cetirizine (ZYRTEC) 10 MG Tab Take 1 Tablet by mouth every day. 30 Tablet 1    traZODone (DESYREL) 100 MG Tab Take 1 Tablet by mouth every evening. (Patient taking differently: Take 50 mg by mouth every evening.) 100 Tablet 2    simvastatin (ZOCOR) 80 MG tablet Take 1 Tablet by mouth at bedtime. 90 Tablet 3    cyclobenzaprine (FLEXERIL) 5 mg tablet Take 1-2 Tablets by mouth at bedtime as needed for Muscle Spasms. 15 Tablet 0    aspirin (ASA) 81 MG Chew Tab chewable tablet CHEW ONE TABLET BY MOUTH DAILY WITH FOOD 90 Tablet 3    hydroCHLOROthiazide 25 MG Tab TAKE 1 TABLET BY MOUTH DAILY 90 Tablet 3    tamsulosin (FLOMAX) 0.4 MG capsule TAKE 1 CAPSULE BY MOUTH DAILY 30 MINUTES AFTER BREAKFAST 90 Capsule 3    predniSONE (DELTASONE) 20 MG Tab Take 20 mg by mouth.      artificial tears (EYE LUBRICANT) Ointment ophth ointment Apply 1 Application to both eyes every 8 hours. Systane PF      Ascorbic Acid (VITAMIN C) 500 MG Cap       fluticasone (FLONASE) 50 MCG/ACT nasal spray Administer 1 Spray into affected nostril(S) every day. 16 g 0    colchicine (COLCRYS) 0.6 MG Tab Take 1 Tablet by mouth every day. 30 Tablet 3    latanoprost (XALATAN) 0.005 % Solution        Current Facility-Administered Medications   Medication Dose Route Frequency Provider Last Rate Last Admin    triamcinolone acetonide (Kenalog-40) injection 40 mg  40 mg  "Intra-articular Once         triamcinolone acetonide (Kenalog-40) injection 40 mg  40 mg Intra-articular Once          He  has a past medical history of Thyroid disease.  He  has a past surgical history that includes hernia repair.  Social History     Tobacco Use    Smoking status: Never    Smokeless tobacco: Never   Vaping Use    Vaping status: Never Used   Substance Use Topics    Alcohol use: Yes     Alcohol/week: 12.0 oz     Types: 14 Glasses of wine, 6 Shots of liquor per week     Comment: every day    Drug use: No     Social History     Social History Narrative    Not on file     Family History   Problem Relation Age of Onset    Heart Attack Mother     Cancer Father         brain tumor    Stroke Maternal Grandmother 75    Heart Disease Paternal Grandmother     Heart Attack Paternal Grandfather      Family Status   Relation Name Status    Mo      Fa      Bro  Alive    MGMo      MGFa      PGMo      PGFa     No partnership data on file         ROS    The pertinent  ROS findings can be seen in the HPI above.     All other systems reviewed and are negative     Objective:     /62 (BP Location: Right arm, Patient Position: Sitting, BP Cuff Size: Adult)   Pulse 80   Temp 36.4 °C (97.5 °F) (Temporal)   Resp 16   Ht 1.656 m (5' 5.2\")   Wt 72.9 kg (160 lb 12.8 oz)   SpO2 95%  Body mass index is 26.59 kg/m².      Physical Exam:    Constitutional: Alert, no distress.  Skin: No suspicious lesions  Eye: Equal, round and reactive, conjunctiva clear, lids normal.  ENMT: Lips without lesions, good dentition, oropharynx clear.  Neck: Trachea midline, no masses, no thyromegaly. No cervical or supraclavicular lymphadenopathy.  Respiratory: Unlabored respiratory effort, lungs clear to auscultation, no wheezes, no ronchi.  Cardiovascular: Normal S1, S2, no murmur, no edema  Abdomen: Soft, non-tender, no masses, no hepatosplenomegaly.  MSK--tendor nodule felt directly over " tendon over volar aspect of MCP joint on   Right thumb      Procedure Trigger Finger injection:     After appropriate verbal/written consent     The hand was placed flat with the palm up and the fingers outstretched. The injection is directed towards the point of maximal tenderness of the A1 pulley, which arises from the palmar aspect of the metacarpal head and metacarpophalangeal (MCP) joint. The injection site was prepped with  chlorhexidine. Ethyl chloride is sprayed on the skin for anesthesia. A 5/8-inch, 25-gauge needle was inserted to a depth of 1/4 to 3/8 inch for trigger finger.  The needle was positioned at a 45-degree angle to the skin with the needle tip directed proximally, and is advanced down to the firm resistance of the flexor tendon, i felt a rubbery sensation. The needle was backed up about 1 to 2 mm, and  1 mL of triamcinolone (10 mg/mL) mixed with a local anesthetic (such as 1/2 mL of lidocaine) is injected around the tendon sheath.     Assessment and Plan:   The following treatment plan was discussed    All recent labs and provider notes reviewed      Assessment & Plan  1. Trigger finger.  He reports recurrence of trigger finger symptoms, with the finger getting stuck at the knuckle and associated pain.  Treatment plan: An injection will be administered today to alleviate the symptoms.    2. Hearing loss.  He has undergone a hearing test and is considering hearing aids.  Clinical decision making: He prefers to address potential gastrointestinal issues first before deciding on hearing aids.    3. Sleep disturbance.  He has been advised to increase the dosage of trazodone to a full tablet to help manage his sleep disturbances.  Treatment plan: Increase trazodone dosage to a full tablet.    4. Thyroid disorder.  His current levothyroxine dosage will be reduced from 125 mcg to 112 mcg.  Treatment plan: A new prescription will be sent to [facility] pharmacy. Laboratory tests will be repeated in 6 to 8  weeks to monitor the effectiveness of the dosage adjustment.    5. Vitamin D deficiency.  He has been advised to take an over-the-counter vitamin D supplement at a dosage of 4000 units daily.  Treatment plan: Over-the-counter vitamin D supplement at 4000 units daily.    6. Elevated monocyte count.  This is not concerning.    7. Elevated glucose levels.  This could be due to his dietary habits, specifically the consumption of bread with honey for breakfast.  Treatment plan: He has been advised to increase his physical activity.    Follow-up: Laboratory tests for thyroid disorder in 6 to 8 weeks.    PROCEDURE  A steroid injection was administered today for trigger finger.     1. Trigger finger of right thumb  - triamcinolone acetonide (Kenalog-40) injection 40 mg  - triamcinolone acetonide (Kenalog-40) injection 40 mg    2. Hypothyroidism, unspecified type  - levothyroxine (SYNTHROID) 112 MCG Tab; Take 1 Tablet by mouth every morning on an empty stomach.  Dispense: 100 Tablet; Refill: 2  - TSH+FREE T4  - T3 FREE; Future       Secondary to the complexity of this patient's illnesses and their interactions.  All problems listed were discussed during the office visit, medications were evaluated and complexities were discussed as well as plan for the future.        Instructed to Follow up in clinic or ER for worsening symptoms, difficulty breathing, lack of expected recovery, or should new symptoms or problems arise.    Followup: Return in about 2 months (around 6/4/2025) for labs.

## 2025-05-16 ENCOUNTER — HOSPITAL ENCOUNTER (OUTPATIENT)
Dept: LAB | Facility: MEDICAL CENTER | Age: 75
End: 2025-05-16
Attending: FAMILY MEDICINE
Payer: MEDICARE

## 2025-05-16 DIAGNOSIS — E03.9 HYPOTHYROIDISM, UNSPECIFIED TYPE: ICD-10-CM

## 2025-05-16 PROCEDURE — 84443 ASSAY THYROID STIM HORMONE: CPT

## 2025-05-16 PROCEDURE — 84439 ASSAY OF FREE THYROXINE: CPT

## 2025-05-16 PROCEDURE — 36415 COLL VENOUS BLD VENIPUNCTURE: CPT

## 2025-05-16 PROCEDURE — 84481 FREE ASSAY (FT-3): CPT

## 2025-05-17 LAB
T3FREE SERPL-MCNC: 2.54 PG/ML (ref 2–4.4)
T4 FREE SERPL-MCNC: 1.92 NG/DL (ref 0.93–1.7)
TSH SERPL-ACNC: 0.08 UIU/ML (ref 0.38–5.33)

## 2025-05-20 ENCOUNTER — RESULTS FOLLOW-UP (OUTPATIENT)
Dept: MEDICAL GROUP | Age: 75
End: 2025-05-20

## 2025-06-05 DIAGNOSIS — I10 ESSENTIAL HYPERTENSION: ICD-10-CM

## 2025-06-06 RX ORDER — HYDROCHLOROTHIAZIDE 25 MG/1
25 TABLET ORAL DAILY
Qty: 90 TABLET | Refills: 3 | Status: SHIPPED | OUTPATIENT
Start: 2025-06-06

## 2025-06-06 NOTE — TELEPHONE ENCOUNTER
Received request via: Pharmacy    Was the patient seen in the last year in this department? Yes    Does the patient have an active prescription (recently filled or refills available) for medication(s) requested? YES    Pharmacy Name: [facility]    Does the patient have [facility] and need 100-day supply? (This applies to ALL medications) Yes, quantity updated to 100 days

## 2025-07-08 ENCOUNTER — PATIENT MESSAGE (OUTPATIENT)
Dept: MEDICAL GROUP | Age: 75
End: 2025-07-08

## 2025-07-08 ENCOUNTER — APPOINTMENT (OUTPATIENT)
Dept: LAB | Facility: MEDICAL CENTER | Age: 75
End: 2025-07-08
Payer: MEDICARE

## 2025-07-08 DIAGNOSIS — E03.9 HYPOTHYROIDISM, UNSPECIFIED TYPE: Primary | ICD-10-CM

## 2025-07-15 ENCOUNTER — HOSPITAL ENCOUNTER (OUTPATIENT)
Dept: LAB | Facility: MEDICAL CENTER | Age: 75
End: 2025-07-15
Attending: FAMILY MEDICINE
Payer: MEDICARE

## 2025-07-15 DIAGNOSIS — E03.9 HYPOTHYROIDISM, UNSPECIFIED TYPE: ICD-10-CM

## 2025-07-15 LAB
T3FREE SERPL-MCNC: 2.15 PG/ML (ref 2–4.4)
T4 FREE SERPL-MCNC: 1.45 NG/DL (ref 0.93–1.7)
TSH SERPL-ACNC: 1.34 UIU/ML (ref 0.38–5.33)

## 2025-07-15 PROCEDURE — 36415 COLL VENOUS BLD VENIPUNCTURE: CPT

## 2025-07-15 PROCEDURE — 84439 ASSAY OF FREE THYROXINE: CPT

## 2025-07-15 PROCEDURE — 84443 ASSAY THYROID STIM HORMONE: CPT

## 2025-07-15 PROCEDURE — 84481 FREE ASSAY (FT-3): CPT

## 2025-08-13 ENCOUNTER — APPOINTMENT (OUTPATIENT)
Dept: LAB | Facility: MEDICAL CENTER | Age: 75
End: 2025-08-13
Payer: MEDICARE

## 2025-08-14 ENCOUNTER — PATIENT MESSAGE (OUTPATIENT)
Dept: MEDICAL GROUP | Age: 75
End: 2025-08-14
Payer: MEDICARE

## 2025-08-14 DIAGNOSIS — E55.9 VITAMIN D DEFICIENCY: ICD-10-CM

## 2025-08-14 DIAGNOSIS — E78.00 PURE HYPERCHOLESTEROLEMIA: ICD-10-CM

## 2025-08-14 DIAGNOSIS — E03.9 HYPOTHYROIDISM, UNSPECIFIED TYPE: Primary | ICD-10-CM

## 2025-08-14 DIAGNOSIS — N40.0 BENIGN PROSTATIC HYPERPLASIA, UNSPECIFIED WHETHER LOWER URINARY TRACT SYMPTOMS PRESENT: ICD-10-CM

## 2025-08-15 ENCOUNTER — HOSPITAL ENCOUNTER (OUTPATIENT)
Dept: LAB | Facility: MEDICAL CENTER | Age: 75
End: 2025-08-15
Attending: FAMILY MEDICINE
Payer: MEDICARE

## 2025-08-15 DIAGNOSIS — N40.0 BENIGN PROSTATIC HYPERPLASIA, UNSPECIFIED WHETHER LOWER URINARY TRACT SYMPTOMS PRESENT: ICD-10-CM

## 2025-08-15 DIAGNOSIS — E78.00 PURE HYPERCHOLESTEROLEMIA: ICD-10-CM

## 2025-08-15 DIAGNOSIS — E55.9 VITAMIN D DEFICIENCY: ICD-10-CM

## 2025-08-15 DIAGNOSIS — E78.2 MIXED HYPERLIPIDEMIA: ICD-10-CM

## 2025-08-15 DIAGNOSIS — E03.9 HYPOTHYROIDISM, UNSPECIFIED TYPE: ICD-10-CM

## 2025-08-15 LAB
25(OH)D3 SERPL-MCNC: 29 NG/ML (ref 30–100)
ALBUMIN SERPL BCP-MCNC: 4.3 G/DL (ref 3.2–4.9)
ALBUMIN/GLOB SERPL: 2 G/DL
ALP SERPL-CCNC: 47 U/L (ref 30–99)
ALT SERPL-CCNC: 26 U/L (ref 2–50)
ANION GAP SERPL CALC-SCNC: 12 MMOL/L (ref 7–16)
AST SERPL-CCNC: 28 U/L (ref 12–45)
BASOPHILS # BLD AUTO: 0.6 % (ref 0–1.8)
BASOPHILS # BLD: 0.04 K/UL (ref 0–0.12)
BILIRUB SERPL-MCNC: 0.4 MG/DL (ref 0.1–1.5)
BUN SERPL-MCNC: 19 MG/DL (ref 8–22)
CALCIUM ALBUM COR SERPL-MCNC: 8.9 MG/DL (ref 8.5–10.5)
CALCIUM SERPL-MCNC: 9.1 MG/DL (ref 8.5–10.5)
CHLORIDE SERPL-SCNC: 100 MMOL/L (ref 96–112)
CHOLEST SERPL-MCNC: 164 MG/DL (ref 100–199)
CO2 SERPL-SCNC: 23 MMOL/L (ref 20–33)
CREAT SERPL-MCNC: 1.15 MG/DL (ref 0.5–1.4)
EOSINOPHIL # BLD AUTO: 0.14 K/UL (ref 0–0.51)
EOSINOPHIL NFR BLD: 2 % (ref 0–6.9)
ERYTHROCYTE [DISTWIDTH] IN BLOOD BY AUTOMATED COUNT: 41.9 FL (ref 35.9–50)
GFR SERPLBLD CREATININE-BSD FMLA CKD-EPI: 66 ML/MIN/1.73 M 2
GLOBULIN SER CALC-MCNC: 2.2 G/DL (ref 1.9–3.5)
GLUCOSE SERPL-MCNC: 95 MG/DL (ref 65–99)
HCT VFR BLD AUTO: 39 % (ref 42–52)
HDLC SERPL-MCNC: 66 MG/DL
HGB BLD-MCNC: 13.7 G/DL (ref 14–18)
IMM GRANULOCYTES # BLD AUTO: 0.04 K/UL (ref 0–0.11)
IMM GRANULOCYTES NFR BLD AUTO: 0.6 % (ref 0–0.9)
LDLC SERPL CALC-MCNC: 74 MG/DL
LYMPHOCYTES # BLD AUTO: 0.54 K/UL (ref 1–4.8)
LYMPHOCYTES NFR BLD: 7.8 % (ref 22–41)
MCH RBC QN AUTO: 32.4 PG (ref 27–33)
MCHC RBC AUTO-ENTMCNC: 35.1 G/DL (ref 32.3–36.5)
MCV RBC AUTO: 92.2 FL (ref 81.4–97.8)
MONOCYTES # BLD AUTO: 0.89 K/UL (ref 0–0.85)
MONOCYTES NFR BLD AUTO: 12.9 % (ref 0–13.4)
NEUTROPHILS # BLD AUTO: 5.25 K/UL (ref 1.82–7.42)
NEUTROPHILS NFR BLD: 76.1 % (ref 44–72)
NRBC # BLD AUTO: 0 K/UL
NRBC BLD-RTO: 0 /100 WBC (ref 0–0.2)
PLATELET # BLD AUTO: 239 K/UL (ref 164–446)
PMV BLD AUTO: 9.6 FL (ref 9–12.9)
POTASSIUM SERPL-SCNC: 4.2 MMOL/L (ref 3.6–5.5)
PROT SERPL-MCNC: 6.5 G/DL (ref 6–8.2)
PSA SERPL DL<=0.01 NG/ML-MCNC: 0.76 NG/ML (ref 0–4)
RBC # BLD AUTO: 4.23 M/UL (ref 4.7–6.1)
SODIUM SERPL-SCNC: 135 MMOL/L (ref 135–145)
T3FREE SERPL-MCNC: 2.25 PG/ML (ref 2–4.4)
T4 FREE SERPL-MCNC: 1.51 NG/DL (ref 0.93–1.7)
TRIGL SERPL-MCNC: 121 MG/DL (ref 0–149)
TSH SERPL-ACNC: 1.31 UIU/ML (ref 0.38–5.33)
WBC # BLD AUTO: 6.9 K/UL (ref 4.8–10.8)

## 2025-08-15 PROCEDURE — 84439 ASSAY OF FREE THYROXINE: CPT

## 2025-08-15 PROCEDURE — 80053 COMPREHEN METABOLIC PANEL: CPT

## 2025-08-15 PROCEDURE — 84481 FREE ASSAY (FT-3): CPT

## 2025-08-15 PROCEDURE — 82306 VITAMIN D 25 HYDROXY: CPT

## 2025-08-15 PROCEDURE — 84153 ASSAY OF PSA TOTAL: CPT

## 2025-08-15 PROCEDURE — 84443 ASSAY THYROID STIM HORMONE: CPT

## 2025-08-15 PROCEDURE — 80061 LIPID PANEL: CPT

## 2025-08-15 PROCEDURE — 36415 COLL VENOUS BLD VENIPUNCTURE: CPT

## 2025-08-15 PROCEDURE — 85025 COMPLETE CBC W/AUTO DIFF WBC: CPT
